# Patient Record
Sex: MALE | Race: WHITE | NOT HISPANIC OR LATINO | Employment: OTHER | URBAN - METROPOLITAN AREA
[De-identification: names, ages, dates, MRNs, and addresses within clinical notes are randomized per-mention and may not be internally consistent; named-entity substitution may affect disease eponyms.]

---

## 2017-02-01 ENCOUNTER — HOSPITAL ENCOUNTER (OUTPATIENT)
Dept: LAB | Facility: MEDICAL CENTER | Age: 62
End: 2017-02-01
Attending: INTERNAL MEDICINE
Payer: COMMERCIAL

## 2017-02-01 ENCOUNTER — TELEPHONE (OUTPATIENT)
Dept: CARDIOLOGY | Facility: MEDICAL CENTER | Age: 62
End: 2017-02-01

## 2017-02-01 ENCOUNTER — OFFICE VISIT (OUTPATIENT)
Dept: CARDIOLOGY | Facility: MEDICAL CENTER | Age: 62
End: 2017-02-01
Payer: COMMERCIAL

## 2017-02-01 DIAGNOSIS — I48.0 PAROXYSMAL ATRIAL FIBRILLATION (HCC): ICD-10-CM

## 2017-02-01 LAB
ALBUMIN SERPL BCP-MCNC: 3.9 G/DL (ref 3.2–4.9)
ALBUMIN/GLOB SERPL: 1.4 G/DL
ALP SERPL-CCNC: 59 U/L (ref 30–99)
ALT SERPL-CCNC: 23 U/L (ref 2–50)
ANION GAP SERPL CALC-SCNC: 7 MMOL/L (ref 0–11.9)
AST SERPL-CCNC: 25 U/L (ref 12–45)
BASOPHILS # BLD AUTO: 0.7 % (ref 0–1.8)
BASOPHILS # BLD: 0.06 K/UL (ref 0–0.12)
BILIRUB SERPL-MCNC: 0.9 MG/DL (ref 0.1–1.5)
BUN SERPL-MCNC: 18 MG/DL (ref 8–22)
CALCIUM SERPL-MCNC: 9.2 MG/DL (ref 8.4–10.2)
CHLORIDE SERPL-SCNC: 106 MMOL/L (ref 96–112)
CO2 SERPL-SCNC: 25 MMOL/L (ref 20–33)
CREAT SERPL-MCNC: 1.28 MG/DL (ref 0.5–1.4)
EOSINOPHIL # BLD AUTO: 0.13 K/UL (ref 0–0.51)
EOSINOPHIL NFR BLD: 1.6 % (ref 0–6.9)
ERYTHROCYTE [DISTWIDTH] IN BLOOD BY AUTOMATED COUNT: 39.7 FL (ref 35.9–50)
GFR SERPL CREATININE-BSD FRML MDRD: 57 ML/MIN/1.73 M 2
GLOBULIN SER CALC-MCNC: 2.7 G/DL (ref 1.9–3.5)
GLUCOSE SERPL-MCNC: 115 MG/DL (ref 65–99)
HCT VFR BLD AUTO: 42.6 % (ref 42–52)
HGB BLD-MCNC: 14.8 G/DL (ref 14–18)
IMM GRANULOCYTES # BLD AUTO: 0.02 K/UL (ref 0–0.11)
IMM GRANULOCYTES NFR BLD AUTO: 0.2 % (ref 0–0.9)
LYMPHOCYTES # BLD AUTO: 3.28 K/UL (ref 1–4.8)
LYMPHOCYTES NFR BLD: 40 % (ref 22–41)
MCH RBC QN AUTO: 30.2 PG (ref 27–33)
MCHC RBC AUTO-ENTMCNC: 34.7 G/DL (ref 33.7–35.3)
MCV RBC AUTO: 86.9 FL (ref 81.4–97.8)
MONOCYTES # BLD AUTO: 0.55 K/UL (ref 0–0.85)
MONOCYTES NFR BLD AUTO: 6.7 % (ref 0–13.4)
NEUTROPHILS # BLD AUTO: 4.17 K/UL (ref 1.82–7.42)
NEUTROPHILS NFR BLD: 50.8 % (ref 44–72)
NRBC # BLD AUTO: 0 K/UL
NRBC BLD AUTO-RTO: 0 /100 WBC
PLATELET # BLD AUTO: 229 K/UL (ref 164–446)
PMV BLD AUTO: 9.8 FL (ref 9–12.9)
POTASSIUM SERPL-SCNC: 3.8 MMOL/L (ref 3.6–5.5)
PROT SERPL-MCNC: 6.6 G/DL (ref 6–8.2)
RBC # BLD AUTO: 4.9 M/UL (ref 4.7–6.1)
SODIUM SERPL-SCNC: 138 MMOL/L (ref 135–145)
TSH SERPL DL<=0.005 MIU/L-ACNC: 2.25 UIU/ML (ref 0.35–5.5)
WBC # BLD AUTO: 8.2 K/UL (ref 4.8–10.8)

## 2017-02-01 PROCEDURE — 85025 COMPLETE CBC W/AUTO DIFF WBC: CPT

## 2017-02-01 PROCEDURE — 80053 COMPREHEN METABOLIC PANEL: CPT

## 2017-02-01 PROCEDURE — 36415 COLL VENOUS BLD VENIPUNCTURE: CPT

## 2017-02-01 PROCEDURE — 84443 ASSAY THYROID STIM HORMONE: CPT

## 2017-02-01 NOTE — MR AVS SNAPSHOT
Mg Loving Rehana TURNER   2017 10:00 AM   Office Visit   MRN: [id number]    Department:  Heart [facility]   Dept Phone:  [phone]    Description:  Male : 1955   Provider:  Oleg Stanton M.D.           Allergies as of 2017     Allergen Noted Reactions    Amoxicillin 2011       Ampicillin 2011         You were diagnosed with     Paroxysmal atrial fibrillation (CMS-HCC)   [701299]         Vital Signs     Smoking Status                   Never Smoker            Basic Information     Date Of Birth Sex Race Ethnicity Preferred Language    1955 Male White Non- English      Your appointments     2017  1:15 PM   ECHO with ECHO [facility]   ECHOCARDIOLOGY [facility])    [address]  [place] [address]   [phone]           No prep              Problem List              ICD-10-CM Priority Class Noted - Resolved    Paroxysmal atrial fibrillation (CMS-HCC) I48.0   2017 - Present      Health Maintenance        Date Due Completion Dates    IMM DTaP/Tdap/Td Vaccine (1 - Tdap) 10/20/1972 ---    COLONOSCOPY 10/20/2003 ---    IMM ZOSTER VACCINE 10/20/2013 ---    IMM INFLUENZA (1) 2016 ---            Current Immunizations     No immunizations on file.      Below and/or attached are the medications your provider expects you to take. Review all of your home medications and newly ordered medications with your provider and/or pharmacist. Follow medication instructions as directed by your provider and/or pharmacist. Please keep your medication list with you and share with your provider. Update the information when medications are discontinued, doses are changed, or new medications (including over-the-counter products) are added; and carry medication information at all times in the event of emergency situations     Allergies:  AMOXICILLIN - (reactions not documented)     AMPICILLIN - (reactions not documented)               Medications  Valid as of:  February 09, 2017 -  1:25 PM    Generic Name Brand Name Tablet Size Instructions for use    Apixaban (Tab) ELIQUIS 5mg Take 1 Tab by mouth 2 Times a Day.        Aspirin Buf([id number]) (Tab) ASCRIPTIN 325 MG Take 2 Tabs by mouth every four hours as needed.        .                 Medicines prescribed today were sent to:     None      Medication refill instructions:       If your prescription bottle indicates you have medication refills left, it is not necessary to call your provider’s office. Please contact your pharmacy and they will refill your medication.    If your prescription bottle indicates you do not have any refills left, you may request refills at any time through one of the following ways: The online [employer] system (except Urgent Care), by calling your provider’s office, or by asking your pharmacy to contact your provider’s office with a refill request. Medication refills are processed only during regular business hours and may not be available until the next business day. Your provider may request additional information or to have a follow-up visit with you prior to refilling your medication.   *Please Note: Medication refills are assigned a new Rx number when refilled electronically. Your pharmacy may indicate that no refills were authorized even though a new prescription for the same medication is available at the pharmacy. Please request the medicine by name with the pharmacy before contacting your provider for a refill.           [employer] Access Code: [email or web address]  Expires: 3/3/2017 11:43 AM    [employer]  A secure, online tool to manage your health information     [employer]® is a secure, online tool that connects you to your personalized health information from the privacy of your home -- day or night - making it very easy for you to manage your healthcare. Once the activation process is completed, you can even access your medical information using the [employer] harmony, which is  available for free in the [employer] Demi store or Google Play store.     [employer] provides the following levels of access (as shown below):   My Chart Features   [facility] Doctor [facility]  Specialists [facility]  Urgent  Care Non-[facility]  Primary Care  Doctor   Email your healthcare team securely and privately 24/7 X X X    Manage appointments: schedule your next appointment; view details of past/upcoming appointments X      Request prescription refills. X      View recent personal medical records, including lab and immunizations X X X X   View health record, including health history, allergies, medications X X X X   Read reports about your outpatient visits, procedures, consult and ER notes X X X X   See your discharge summary, which is a recap of your hospital and/or ER visit that includes your diagnosis, lab results, and care plan. X X       How to register for [employer]:  1. Go to  [email or web address].  2. Click on the Sign Up Now box, which takes you to the New Member Sign Up page. You will need to provide the following information:  a. Enter your [employer] Access Code exactly as it appears at the top of this page. (You will not need to use this code after you’ve completed the sign-up process. If you do not sign up before the expiration date, you must request a new code.)   b. Enter your date of birth.   c. Enter your home email address.   d. Click Submit, and follow the next screen’s instructions.  3. Create a [employer] ID. This will be your [employer] login ID and cannot be changed, so think of one that is secure and easy to remember.  4. Create a [employer] password. You can change your password at any time.  5. Enter your Password Reset Question and Answer. This can be used at a later time if you forget your password.   6. Enter your e-mail address. This allows you to receive e-mail notifications when new information is available in [employer].  7. Click Sign Up. You can now view your health information.    For  assistance activating your [employer] account, call [phone]

## 2017-02-01 NOTE — PROGRESS NOTES
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 By the time he got to the office he was in normal sinus rhythm.  He has a supply of eliquis but will not begin it unless he has a relapse.  I ordered lab and an echocardiogram and he will see Dr. Interiano in follow-up.

## 2017-02-01 NOTE — TELEPHONE ENCOUNTER
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 Duration is uncertain but he thinks it's probably less than 24 hours. He just noticed that his rhythm was more irregular than usual on his way to work this morning. He does have chronic premature beats. He sees Dr. Interiano and is going to see him in follow-up but we will start apixaban now and get lab and an echo in the works. He will use the emergency medical system for any symptoms. He has not had any bleeding problems or contraindications to anticoagulation

## 2017-02-01 NOTE — Clinical Note
[facility]   [address]  [place] [address]  Phone: [phone]  Fax: [phone]              Mg Kimball  1955    Encounter Date: 2/1/2017    Oleg Stanton M.D.          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 By the time he got to the office he was in normal sinus rhythm.  He has a supply of eliquis but will not begin it unless he has a relapse.  I ordered lab and an echocardiogram and he will see Dr. Interiano in follow-up.      No Recipients

## 2017-02-21 ENCOUNTER — APPOINTMENT (OUTPATIENT)
Dept: CARDIOLOGY | Facility: MEDICAL CENTER | Age: 62
End: 2017-02-21
Attending: INTERNAL MEDICINE
Payer: COMMERCIAL

## 2018-01-05 ENCOUNTER — HOSPITAL ENCOUNTER (OUTPATIENT)
Dept: LAB | Facility: MEDICAL CENTER | Age: 63
End: 2018-01-05
Attending: INTERNAL MEDICINE
Payer: COMMERCIAL

## 2018-01-05 LAB
ALBUMIN SERPL BCP-MCNC: 4.1 G/DL (ref 3.2–4.9)
ALBUMIN/GLOB SERPL: 1.5 G/DL
ALP SERPL-CCNC: 60 U/L (ref 30–99)
ALT SERPL-CCNC: 16 U/L (ref 2–50)
ANION GAP SERPL CALC-SCNC: 6 MMOL/L (ref 0–11.9)
AST SERPL-CCNC: 20 U/L (ref 12–45)
BASOPHILS # BLD AUTO: 0.8 % (ref 0–1.8)
BASOPHILS # BLD: 0.06 K/UL (ref 0–0.12)
BILIRUB SERPL-MCNC: 0.9 MG/DL (ref 0.1–1.5)
BUN SERPL-MCNC: 18 MG/DL (ref 8–22)
CALCIUM SERPL-MCNC: 9.5 MG/DL (ref 8.5–10.5)
CHLORIDE SERPL-SCNC: 105 MMOL/L (ref 96–112)
CO2 SERPL-SCNC: 24 MMOL/L (ref 20–33)
CREAT SERPL-MCNC: 1.25 MG/DL (ref 0.5–1.4)
EOSINOPHIL # BLD AUTO: 0.05 K/UL (ref 0–0.51)
EOSINOPHIL NFR BLD: 0.7 % (ref 0–6.9)
ERYTHROCYTE [DISTWIDTH] IN BLOOD BY AUTOMATED COUNT: 39.8 FL (ref 35.9–50)
GFR SERPL CREATININE-BSD FRML MDRD: 58 ML/MIN/1.73 M 2
GLOBULIN SER CALC-MCNC: 2.7 G/DL (ref 1.9–3.5)
GLUCOSE SERPL-MCNC: 105 MG/DL (ref 65–99)
HCT VFR BLD AUTO: 43 % (ref 42–52)
HGB BLD-MCNC: 14.7 G/DL (ref 14–18)
IMM GRANULOCYTES # BLD AUTO: 0.02 K/UL (ref 0–0.11)
IMM GRANULOCYTES NFR BLD AUTO: 0.3 % (ref 0–0.9)
LYMPHOCYTES # BLD AUTO: 2.52 K/UL (ref 1–4.8)
LYMPHOCYTES NFR BLD: 34.2 % (ref 22–41)
MCH RBC QN AUTO: 30 PG (ref 27–33)
MCHC RBC AUTO-ENTMCNC: 34.2 G/DL (ref 33.7–35.3)
MCV RBC AUTO: 87.8 FL (ref 81.4–97.8)
MONOCYTES # BLD AUTO: 0.41 K/UL (ref 0–0.85)
MONOCYTES NFR BLD AUTO: 5.6 % (ref 0–13.4)
NEUTROPHILS # BLD AUTO: 4.3 K/UL (ref 1.82–7.42)
NEUTROPHILS NFR BLD: 58.4 % (ref 44–72)
NRBC # BLD AUTO: 0 K/UL
NRBC BLD-RTO: 0 /100 WBC
PLATELET # BLD AUTO: 253 K/UL (ref 164–446)
PMV BLD AUTO: 10.9 FL (ref 9–12.9)
POTASSIUM SERPL-SCNC: 4 MMOL/L (ref 3.6–5.5)
PROT SERPL-MCNC: 6.8 G/DL (ref 6–8.2)
RBC # BLD AUTO: 4.9 M/UL (ref 4.7–6.1)
SODIUM SERPL-SCNC: 135 MMOL/L (ref 135–145)
WBC # BLD AUTO: 7.4 K/UL (ref 4.8–10.8)

## 2018-01-05 PROCEDURE — 36415 COLL VENOUS BLD VENIPUNCTURE: CPT

## 2018-01-05 PROCEDURE — 80053 COMPREHEN METABOLIC PANEL: CPT

## 2018-01-05 PROCEDURE — 85025 COMPLETE CBC W/AUTO DIFF WBC: CPT

## 2018-01-16 ENCOUNTER — HOSPITAL ENCOUNTER (OUTPATIENT)
Dept: RADIOLOGY | Facility: MEDICAL CENTER | Age: 63
End: 2018-01-16
Attending: INTERNAL MEDICINE
Payer: COMMERCIAL

## 2018-01-16 DIAGNOSIS — R10.12 LUQ PAIN: ICD-10-CM

## 2018-01-16 PROCEDURE — 74177 CT ABD & PELVIS W/CONTRAST: CPT

## 2018-01-16 PROCEDURE — 700117 HCHG RX CONTRAST REV CODE 255: Performed by: INTERNAL MEDICINE

## 2018-01-16 RX ADMIN — IOHEXOL 100 ML: 350 INJECTION, SOLUTION INTRAVENOUS at 14:37

## 2018-01-16 RX ADMIN — IOHEXOL 50 ML: 240 INJECTION, SOLUTION INTRATHECAL; INTRAVASCULAR; INTRAVENOUS; ORAL at 14:37

## 2018-10-01 ENCOUNTER — IMMUNIZATION (OUTPATIENT)
Dept: OCCUPATIONAL MEDICINE | Facility: CLINIC | Age: 63
End: 2018-10-01

## 2018-10-01 DIAGNOSIS — Z23 NEED FOR VACCINATION: ICD-10-CM

## 2018-10-13 PROCEDURE — 90686 IIV4 VACC NO PRSV 0.5 ML IM: CPT | Performed by: PREVENTIVE MEDICINE

## 2019-05-18 ENCOUNTER — APPOINTMENT (OUTPATIENT)
Dept: RADIOLOGY | Facility: MEDICAL CENTER | Age: 64
End: 2019-05-18
Attending: EMERGENCY MEDICINE
Payer: COMMERCIAL

## 2019-05-18 ENCOUNTER — HOSPITAL ENCOUNTER (EMERGENCY)
Facility: MEDICAL CENTER | Age: 64
End: 2019-05-18
Attending: EMERGENCY MEDICINE
Payer: COMMERCIAL

## 2019-05-18 VITALS
RESPIRATION RATE: 19 BRPM | HEIGHT: 71 IN | TEMPERATURE: 97.6 F | BODY MASS INDEX: 26.33 KG/M2 | OXYGEN SATURATION: 97 % | HEART RATE: 83 BPM | WEIGHT: 188.05 LBS | SYSTOLIC BLOOD PRESSURE: 120 MMHG | DIASTOLIC BLOOD PRESSURE: 86 MMHG

## 2019-05-18 DIAGNOSIS — I48.0 PAROXYSMAL ATRIAL FIBRILLATION (HCC): ICD-10-CM

## 2019-05-18 LAB
ALBUMIN SERPL BCP-MCNC: 4.1 G/DL (ref 3.2–4.9)
ALBUMIN/GLOB SERPL: 1.4 G/DL
ALP SERPL-CCNC: 60 U/L (ref 30–99)
ALT SERPL-CCNC: 21 U/L (ref 2–50)
ANION GAP SERPL CALC-SCNC: 10 MMOL/L (ref 0–11.9)
AST SERPL-CCNC: 25 U/L (ref 12–45)
BASOPHILS # BLD AUTO: 0.6 % (ref 0–1.8)
BASOPHILS # BLD: 0.06 K/UL (ref 0–0.12)
BILIRUB SERPL-MCNC: 0.8 MG/DL (ref 0.1–1.5)
BUN SERPL-MCNC: 18 MG/DL (ref 8–22)
CALCIUM SERPL-MCNC: 9.1 MG/DL (ref 8.4–10.2)
CHLORIDE SERPL-SCNC: 105 MMOL/L (ref 96–112)
CO2 SERPL-SCNC: 25 MMOL/L (ref 20–33)
CREAT SERPL-MCNC: 1.23 MG/DL (ref 0.5–1.4)
EKG IMPRESSION: NORMAL
EOSINOPHIL # BLD AUTO: 0.2 K/UL (ref 0–0.51)
EOSINOPHIL NFR BLD: 2 % (ref 0–6.9)
ERYTHROCYTE [DISTWIDTH] IN BLOOD BY AUTOMATED COUNT: 41.9 FL (ref 35.9–50)
GLOBULIN SER CALC-MCNC: 2.9 G/DL (ref 1.9–3.5)
GLUCOSE SERPL-MCNC: 97 MG/DL (ref 65–99)
HCT VFR BLD AUTO: 47.8 % (ref 42–52)
HGB BLD-MCNC: 16 G/DL (ref 14–18)
IMM GRANULOCYTES # BLD AUTO: 0.05 K/UL (ref 0–0.11)
IMM GRANULOCYTES NFR BLD AUTO: 0.5 % (ref 0–0.9)
INR PPP: 0.96 (ref 0.87–1.13)
LYMPHOCYTES # BLD AUTO: 4.86 K/UL (ref 1–4.8)
LYMPHOCYTES NFR BLD: 47.8 % (ref 22–41)
MCH RBC QN AUTO: 29.9 PG (ref 27–33)
MCHC RBC AUTO-ENTMCNC: 33.5 G/DL (ref 33.7–35.3)
MCV RBC AUTO: 89.2 FL (ref 81.4–97.8)
MONOCYTES # BLD AUTO: 0.72 K/UL (ref 0–0.85)
MONOCYTES NFR BLD AUTO: 7.1 % (ref 0–13.4)
NEUTROPHILS # BLD AUTO: 4.27 K/UL (ref 1.82–7.42)
NEUTROPHILS NFR BLD: 42 % (ref 44–72)
NRBC # BLD AUTO: 0 K/UL
NRBC BLD-RTO: 0 /100 WBC
PLATELET # BLD AUTO: 248 K/UL (ref 164–446)
PMV BLD AUTO: 9.7 FL (ref 9–12.9)
POTASSIUM SERPL-SCNC: 3.4 MMOL/L (ref 3.6–5.5)
PROT SERPL-MCNC: 7 G/DL (ref 6–8.2)
PROTHROMBIN TIME: 12.7 SEC (ref 12–14.6)
RBC # BLD AUTO: 5.36 M/UL (ref 4.7–6.1)
SODIUM SERPL-SCNC: 140 MMOL/L (ref 135–145)
TROPONIN I SERPL-MCNC: <0.02 NG/ML (ref 0–0.04)
WBC # BLD AUTO: 10.2 K/UL (ref 4.8–10.8)

## 2019-05-18 PROCEDURE — 99285 EMERGENCY DEPT VISIT HI MDM: CPT

## 2019-05-18 PROCEDURE — 85610 PROTHROMBIN TIME: CPT

## 2019-05-18 PROCEDURE — 93005 ELECTROCARDIOGRAM TRACING: CPT | Performed by: EMERGENCY MEDICINE

## 2019-05-18 PROCEDURE — 85025 COMPLETE CBC W/AUTO DIFF WBC: CPT

## 2019-05-18 PROCEDURE — 700102 HCHG RX REV CODE 250 W/ 637 OVERRIDE(OP): Performed by: EMERGENCY MEDICINE

## 2019-05-18 PROCEDURE — 96376 TX/PRO/DX INJ SAME DRUG ADON: CPT

## 2019-05-18 PROCEDURE — 71045 X-RAY EXAM CHEST 1 VIEW: CPT

## 2019-05-18 PROCEDURE — 700111 HCHG RX REV CODE 636 W/ 250 OVERRIDE (IP)

## 2019-05-18 PROCEDURE — 700111 HCHG RX REV CODE 636 W/ 250 OVERRIDE (IP): Performed by: EMERGENCY MEDICINE

## 2019-05-18 PROCEDURE — A9270 NON-COVERED ITEM OR SERVICE: HCPCS | Performed by: EMERGENCY MEDICINE

## 2019-05-18 PROCEDURE — 80053 COMPREHEN METABOLIC PANEL: CPT

## 2019-05-18 PROCEDURE — 96374 THER/PROPH/DIAG INJ IV PUSH: CPT

## 2019-05-18 PROCEDURE — 84484 ASSAY OF TROPONIN QUANT: CPT

## 2019-05-18 PROCEDURE — 36415 COLL VENOUS BLD VENIPUNCTURE: CPT

## 2019-05-18 PROCEDURE — 700105 HCHG RX REV CODE 258: Performed by: EMERGENCY MEDICINE

## 2019-05-18 RX ORDER — SODIUM CHLORIDE 9 MG/ML
1000 INJECTION, SOLUTION INTRAVENOUS ONCE
Status: COMPLETED | OUTPATIENT
Start: 2019-05-18 | End: 2019-05-18

## 2019-05-18 RX ORDER — DILTIAZEM HYDROCHLORIDE 5 MG/ML
INJECTION INTRAVENOUS
Status: COMPLETED
Start: 2019-05-18 | End: 2019-05-18

## 2019-05-18 RX ORDER — DILTIAZEM HYDROCHLORIDE 5 MG/ML
10 INJECTION INTRAVENOUS ONCE
Status: COMPLETED | OUTPATIENT
Start: 2019-05-18 | End: 2019-05-18

## 2019-05-18 RX ORDER — METOPROLOL SUCCINATE 25 MG/1
25 TABLET, EXTENDED RELEASE ORAL ONCE
Status: COMPLETED | OUTPATIENT
Start: 2019-05-18 | End: 2019-05-18

## 2019-05-18 RX ADMIN — DILTIAZEM HYDROCHLORIDE 10 MG: 5 INJECTION INTRAVENOUS at 03:59

## 2019-05-18 RX ADMIN — METOPROLOL SUCCINATE 25 MG: 25 TABLET, EXTENDED RELEASE ORAL at 05:23

## 2019-05-18 RX ADMIN — RIVAROXABAN 20 MG: 20 TABLET, FILM COATED ORAL at 04:51

## 2019-05-18 RX ADMIN — SODIUM CHLORIDE 1000 ML: 9 INJECTION, SOLUTION INTRAVENOUS at 03:21

## 2019-05-18 RX ADMIN — DILTIAZEM HYDROCHLORIDE 10 MG: 5 INJECTION INTRAVENOUS at 03:20

## 2019-05-18 RX ADMIN — SODIUM CHLORIDE 1000 ML: 9 INJECTION, SOLUTION INTRAVENOUS at 04:00

## 2019-05-18 NOTE — ED PROVIDER NOTES
ED Provider Note    CHIEF COMPLAINT  Chief Complaint   Patient presents with   • Rapid Heart Beat   • Shortness of Breath       HPI  Edanastacia Kimball II is a 63 y.o. male who is otherwise healthy, distant hx provoked DVT no longer on anticoagulation, who presents for acute onset of atrial fibrillation.  Patient works as an anesthesiologist, states that a few years ago he had a self-limited episode of atrial fibrillation while he was working which spontaneously converted back to normal sinus rhythm over the course of a few hours.  At that time he had been drinking some energy drinks and it was thought that that was the precipitating factor for his episode of atrial fibrillation.  He is otherwise been doing well since that episode in 2017, is not on any anticoagulation, but today he had a longer day than usual, with decreased water intake and heavy exercise but some increased soda intake, and 2 hours prior to presentation started to feel a fluttering in his chest similar to the previous episode of atrial fibrillation.  He checked his pulse, it was irregular, with a rate of about 170.  He endorses shortness of breath with ambulation but none with rest, no chest pain, no weakness, no dizziness.    REVIEW OF SYSTEMS  Pertinent positives: Irregular heart rate, shortness of breath with ambulation  Pertinent negatives: No chest pain, no dizziness, no weakness, no abdominal pain  10 + systems reviewed and otherwise negative    PAST MEDICAL HISTORY   has a past medical history of DVT (deep venous thrombosis) (CMS-[facility]) and Paroxysmal atrial fibrillation (CMS-[facility]) (2/1/2017).    SOCIAL HISTORY  Social History     Social History Main Topics   • Smoking status: Never Smoker   • Smokeless tobacco: Not on file   • Alcohol use No   • Drug use: No   • Sexual activity: Not on file       SURGICAL HISTORY   has a past surgical history that includes acl repair; tonsillectomy; other; wound exploration ortho (8/13/2011); and  "irrigation & debridement ortho (8/13/2011).    CURRENT MEDICATIONS  Home Medications    **Home medications have not yet been reviewed for this encounter**         ALLERGIES  Allergies   Allergen Reactions   • Amoxicillin    • Ampicillin        PHYSICAL EXAM  VITAL SIGNS: /86   Pulse 65   Temp 36.4 °C (97.6 °F) (Temporal)   Resp 17   Ht 1.803 m (5' 11\")   Wt 85.3 kg (188 lb 0.8 oz)   SpO2 93%   BMI 26.23 kg/m²   Pulse ox interpretation: I interpret this pulse ox as normal.  Constitutional: Alert in no apparent distress   HEENT: EOMI, PERRL, mucous membranes moist, posterior OP clear without exudate   Neck: supple, no cervical lymphadenopathy  Cardiovascular: Irregularly irregular rhythm, no murmurs, no LE edema  Chest wall: No tenderness to palpation   Thorax & Lungs: clear to auscultation bilaterally, no increased WOB, no wheeze or rhonchi   Abdomen: Soft throughout without rebound or guarding, no CVAT   Skin: Warm, Dry, No erythema, no rash   Musculoskeletal: Good range of motion in all major joints.    Neurologic:  Alert and oriented, without focal deficits. Fluent speech.   Psychiatric: Affect normal, Judgment normal, Mood normal.       DIAGNOSTIC STUDIES / PROCEDURES    EKG  See below    LABS  Results for orders placed or performed during the hospital encounter of 05/18/19   CBC WITH DIFFERENTIAL   Result Value Ref Range    WBC 10.2 4.8 - 10.8 K/uL    RBC 5.36 4.70 - 6.10 M/uL    Hemoglobin 16.0 14.0 - 18.0 g/dL    Hematocrit 47.8 42.0 - 52.0 %    MCV 89.2 81.4 - 97.8 fL    MCH 29.9 27.0 - 33.0 pg    MCHC 33.5 (L) 33.7 - 35.3 g/dL    RDW 41.9 35.9 - 50.0 fL    Platelet Count 248 164 - 446 K/uL    MPV 9.7 9.0 - 12.9 fL    Neutrophils-Polys 42.00 (L) 44.00 - 72.00 %    Lymphocytes 47.80 (H) 22.00 - 41.00 %    Monocytes 7.10 0.00 - 13.40 %    Eosinophils 2.00 0.00 - 6.90 %    Basophils 0.60 0.00 - 1.80 %    Immature Granulocytes 0.50 0.00 - 0.90 %    Nucleated RBC 0.00 /100 WBC    Neutrophils " (Absolute) 4.27 1.82 - 7.42 K/uL    Lymphs (Absolute) 4.86 (H) 1.00 - 4.80 K/uL    Monos (Absolute) 0.72 0.00 - 0.85 K/uL    Eos (Absolute) 0.20 0.00 - 0.51 K/uL    Baso (Absolute) 0.06 0.00 - 0.12 K/uL    Immature Granulocytes (abs) 0.05 0.00 - 0.11 K/uL    NRBC (Absolute) 0.00 K/uL   Comp Metabolic Panel   Result Value Ref Range    Sodium 140 135 - 145 mmol/L    Potassium 3.4 (L) 3.6 - 5.5 mmol/L    Chloride 105 96 - 112 mmol/L    Co2 25 20 - 33 mmol/L    Anion Gap 10.0 0.0 - 11.9    Glucose 97 65 - 99 mg/dL    Bun 18 8 - 22 mg/dL    Creatinine 1.23 0.50 - 1.40 mg/dL    Calcium 9.1 8.4 - 10.2 mg/dL    AST(SGOT) 25 12 - 45 U/L    ALT(SGPT) 21 2 - 50 U/L    Alkaline Phosphatase 60 30 - 99 U/L    Total Bilirubin 0.8 0.1 - 1.5 mg/dL    Albumin 4.1 3.2 - 4.9 g/dL    Total Protein 7.0 6.0 - 8.2 g/dL    Globulin 2.9 1.9 - 3.5 g/dL    A-G Ratio 1.4 g/dL   TROPONIN   Result Value Ref Range    Troponin I <0.02 0.00 - 0.04 ng/mL   ESTIMATED GFR   Result Value Ref Range    GFR If African American >60 >60 mL/min/1.73 m 2    GFR If Non African American 59 (A) >60 mL/min/1.73 m 2   EKG   Result Value Ref Range    Report       [facility] Emergency Dept.    Test Date:  2019  Pt Name:    JENNA NAVAS                Department: [facility]  MRN:        [id number]                      Room:       -ROOM 3  Gender:     Male                         Technician:   :        [id number]                   Requested By:FELIPA KAPLAN  Order #:    [id number]                    Reading MD: Felipa Kaplan MD    Measurements  Intervals                                Axis  Rate:       137                          P:  VT:                                      QRS:        -29  QRSD:       90                           T:          55  QT:         312  QTc:        471    Interpretive Statements  ATRIAL FIBRILLATION  BORDERLINE LEFT AXIS DEVIATION  ST DEPRESSION, CONSIDER ISCHEMIA, ANT-LAT LDS  Compared to ECG 2011  10:00:04  atrial fibrillation new, ST depressions new    Electronically Signed On 5- 3:24:44 PDT by Felipa Kaplan MD       Repeat EKG performed at 5:16 AM.  Atrial fibrillation, rate 88, resolution of ST depressions in V2 through V4, no ST elevations, no T wave inversions, otherwise similar EKG to prior with improved rate.    RADIOLOGY  DX-CHEST-PORTABLE (1 VIEW)   Final Result         1.  No acute cardiopulmonary disease.              COURSE & MEDICAL DECISION MAKING  Nursing notes and vital signs were reviewed. (See chart for details)  The patient's medical  records were reviewed, history was obtained from the patient and wife.    62 yo M, distant hx provoked DVT no longer on anticoagulation, self limiting episode of AF (a few hours) in 2017 thought 2/2 energy drinks, presents for acute onset of irregular heart rate a few hours PTA. Physical exam is notable for well appearing, afib on monitor, normal blood pressure, lungs CTA, brisk radial pulses, and no LE edema.  Differential includes hyperthyroid state, electrolyte disorder, ischemia, mass.  As patient has had episodes like this before and they are self-limiting I have low clinical suspicion at this time for thyrotoxicosis or hyperthyroid state.  Plan to check CBC, CMP, troponin, EKG, and chest x-ray.  Given past episode of atrial fibrillation in setting of energy drinks, today's a history of decreased water intake with increased soda intake, plan for rate control with diltiazem as well as IV fluids to see if cardioversion with these interventions is successful.  Patient could qualify for electrical cardioversion if above interventions are not successful as symptoms have been ongoing for only a few hours and he is very aware of his heart rate and rhythm.  Final disposition and management pending above results and response interventions.    INTERVENTIONS  Medications   DILTIAZEM HCL 25 MG/5ML IV SOLN (not administered)   rivaroxaban (XARELTO) tablet 20  mg (not administered)   DILTIAZem (CARDIZEM) injection 10 mg (10 mg Intravenous Given 5/18/19 0320)   NS infusion 1,000 mL (0 mL Intravenous Stopped 5/18/19 0400)   DILTIAZem (CARDIZEM) injection 10 mg (10 mg Intravenous Given 5/18/19 0359)   NS infusion 1,000 mL (1,000 mL Intravenous New Bag 5/18/19 0400)       4:22 AM  Feeling improved. Still in afib with rate 80s-90s. Discussed electrical cardioversion with patient but he wishes to defer. Plan to start anticoagulation with Xarelto, first dose here, d/c to home with Xarelto, metoprolol 25 mg BID for home rate control, to follow up with his cardiologist on Monday for reassessment, echocardiogram, and further management as needed.    5:27 AM  Continues to feel well.  Intermittent episodes of heart rate up to 120 that rapidly resolved heart rate in 80s.  No shortness of breath.  Repeat EKG shows resolution of ST depressions.  Plan for first dose of metoprolol here in the ED, first dose of Xarelto, follow-up cardiology on Monday for repeat evaluation and determination of medical management of patient's atrial fibrillation if it persists.  Patient discharged home with strict return precautions and improved condition in company of wife.    FINAL IMPRESSION  (I48.0) Paroxysmal atrial fibrillation (HCC)      Electronically signed by: Felipa Kaplan, 5/18/2019 2:33 AM      This dictation was created using voice recognition software. The accuracy of the dictation is limited to the abilities of the software. I expect there may be some errors of grammar and possibly content. The nursing notes were reviewed and certain aspects of this information were incorporated into this note.

## 2019-05-18 NOTE — ED NOTES
DC Pt home,  presctiption given.  Pt aware of f/u instructions, aware to return for any changes or concerns.  Pt verbalized understanding of instructions to follow up with PCP. No further questions upon discharge home from emergency room. Pt ambulated out of ER without difficulty.

## 2019-05-18 NOTE — ED TRIAGE NOTES
Chief Complaint   Patient presents with   • Rapid Heart Beat   • Shortness of Breath       /86   Pulse (!) 132   Temp 36.4 °C (97.6 °F) (Temporal)   Resp 19   SpO2 96%

## 2019-06-17 ENCOUNTER — TELEPHONE (OUTPATIENT)
Dept: CARDIOLOGY | Facility: MEDICAL CENTER | Age: 64
End: 2019-06-17

## 2019-06-17 DIAGNOSIS — I48.0 PAROXYSMAL ATRIAL FIBRILLATION (HCC): ICD-10-CM

## 2019-06-17 DIAGNOSIS — R06.02 SOB (SHORTNESS OF BREATH): ICD-10-CM

## 2019-07-11 ENCOUNTER — HOSPITAL ENCOUNTER (OUTPATIENT)
Dept: LAB | Facility: MEDICAL CENTER | Age: 64
End: 2019-07-11
Attending: PLASTIC SURGERY
Payer: COMMERCIAL

## 2019-07-11 PROCEDURE — 88305 TISSUE EXAM BY PATHOLOGIST: CPT

## 2019-07-12 LAB — PATHOLOGY CONSULT NOTE: NORMAL

## 2019-07-17 ENCOUNTER — HOSPITAL ENCOUNTER (OUTPATIENT)
Dept: LAB | Facility: MEDICAL CENTER | Age: 64
End: 2019-07-17
Attending: PLASTIC SURGERY
Payer: COMMERCIAL

## 2019-07-17 PROCEDURE — 88312 SPECIAL STAINS GROUP 1: CPT

## 2019-07-17 PROCEDURE — 88305 TISSUE EXAM BY PATHOLOGIST: CPT

## 2019-07-18 LAB — PATHOLOGY CONSULT NOTE: NORMAL

## 2019-07-19 LAB — AMBIGUOUS DTTM AMBI4: NORMAL

## 2019-09-24 ENCOUNTER — HOSPITAL ENCOUNTER (OUTPATIENT)
Dept: LAB | Facility: MEDICAL CENTER | Age: 64
End: 2019-09-24
Attending: PLASTIC SURGERY
Payer: COMMERCIAL

## 2019-09-24 LAB — PATHOLOGY CONSULT NOTE: NORMAL

## 2019-09-24 PROCEDURE — 88305 TISSUE EXAM BY PATHOLOGIST: CPT

## 2020-02-18 ENCOUNTER — HOSPITAL ENCOUNTER (OUTPATIENT)
Dept: LAB | Facility: MEDICAL CENTER | Age: 65
End: 2020-02-18
Attending: PLASTIC SURGERY
Payer: COMMERCIAL

## 2020-02-18 LAB — PATHOLOGY CONSULT NOTE: NORMAL

## 2020-02-18 PROCEDURE — 88305 TISSUE EXAM BY PATHOLOGIST: CPT

## 2020-04-30 ENCOUNTER — TELEPHONE (OUTPATIENT)
Dept: CARDIOLOGY | Facility: MEDICAL CENTER | Age: 65
End: 2020-04-30

## 2020-04-30 NOTE — TELEPHONE ENCOUNTER
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  He has been taking his 12.5 mg of Metoprolol at night and did notice some change when he started doing that.  He says that his BP is \"fine\" and HR is averaging about 56.  He has a [employer] demi and has been sending the transmissions to the Demi site (apparantly there is a doctor that reviews at the other end) and PAC's is the main finding.      Although Ed is \"not worried\" about the PAC's, and they \"don't really bother him,\" he would like Dr. Orozco to know that this is happening and wonders if he is a candidate for going into atrial fib (his main concern).  He has talked recently with his neighbor- Dr. Interiano- and said that Dr. Interiano told him not to worry and the PAC's are normal.      I asked if he would like to increase the dose of Metoprolol, and he said that he would if that was recommended by Dr. Orozco.  I asked him if he could start sending us his Kardia readings via MY CHART, and he agrees to do this as well.       "

## 2020-04-30 NOTE — TELEPHONE ENCOUNTER
I spoke to him. Has PAC's no real symptoms. No a fib. No change made to meds. He will send Kardia recordings to us

## 2020-04-30 NOTE — TELEPHONE ENCOUNTER
Orozco     Patient is calling wanting to speak with DS regarding extra heartbeats he is having, please call him at [phone].    Thanks

## 2020-08-17 ENCOUNTER — TELEPHONE (OUTPATIENT)
Dept: CARDIOLOGY | Facility: MEDICAL CENTER | Age: 65
End: 2020-08-17

## 2020-08-17 DIAGNOSIS — R07.89 OTHER CHEST PAIN: ICD-10-CM

## 2020-08-17 NOTE — TELEPHONE ENCOUNTER
Per Dr. Interiano: order coronary calcification score ct scan.  Order entered. Called pt. To advise. He will call to schedule test.

## 2020-08-31 ENCOUNTER — HOSPITAL ENCOUNTER (OUTPATIENT)
Dept: RADIOLOGY | Facility: MEDICAL CENTER | Age: 65
End: 2020-08-31
Attending: INTERNAL MEDICINE
Payer: COMMERCIAL

## 2020-08-31 DIAGNOSIS — R07.89 OTHER CHEST PAIN: ICD-10-CM

## 2020-08-31 PROCEDURE — 4410556 CT-CARDIAC SCORING

## 2020-12-17 DIAGNOSIS — Z23 NEED FOR VACCINATION: ICD-10-CM

## 2020-12-29 ENCOUNTER — IMMUNIZATION (OUTPATIENT)
Dept: FAMILY PLANNING/WOMEN'S HEALTH CLINIC | Facility: IMMUNIZATION CENTER | Age: 65
End: 2020-12-29
Attending: FAMILY MEDICINE
Payer: MEDICARE

## 2020-12-29 DIAGNOSIS — Z23 ENCOUNTER FOR VACCINATION: Primary | ICD-10-CM

## 2020-12-29 DIAGNOSIS — Z23 NEED FOR VACCINATION: ICD-10-CM

## 2020-12-29 PROCEDURE — 91301 MODERNA SARS-COV-2 VACCINE: CPT

## 2020-12-29 PROCEDURE — 0011A MODERNA SARS-COV-2 VACCINE: CPT

## 2021-01-29 PROCEDURE — 0012A MODERNA SARS-COV-2 VACCINE: CPT

## 2021-01-29 PROCEDURE — 91301 MODERNA SARS-COV-2 VACCINE: CPT

## 2021-01-31 ENCOUNTER — IMMUNIZATION (OUTPATIENT)
Dept: FAMILY PLANNING/WOMEN'S HEALTH CLINIC | Facility: IMMUNIZATION CENTER | Age: 66
End: 2021-01-31
Payer: MEDICARE

## 2021-01-31 DIAGNOSIS — Z23 ENCOUNTER FOR VACCINATION: Primary | ICD-10-CM

## 2021-06-11 ENCOUNTER — HOSPITAL ENCOUNTER (OUTPATIENT)
Facility: MEDICAL CENTER | Age: 66
End: 2021-06-11
Attending: PLASTIC SURGERY
Payer: MEDICARE

## 2021-06-11 LAB — PATHOLOGY CONSULT NOTE: NORMAL

## 2021-06-11 PROCEDURE — 88305 TISSUE EXAM BY PATHOLOGIST: CPT

## 2021-09-20 ENCOUNTER — OFFICE VISIT (OUTPATIENT)
Dept: CARDIOLOGY | Facility: MEDICAL CENTER | Age: 66
End: 2021-09-20
Payer: MEDICARE

## 2021-09-20 VITALS
DIASTOLIC BLOOD PRESSURE: 64 MMHG | BODY MASS INDEX: 23.34 KG/M2 | HEIGHT: 70 IN | HEART RATE: 64 BPM | WEIGHT: 163 LBS | SYSTOLIC BLOOD PRESSURE: 116 MMHG | OXYGEN SATURATION: 100 %

## 2021-09-20 DIAGNOSIS — I49.1 PAC (PREMATURE ATRIAL CONTRACTION): ICD-10-CM

## 2021-09-20 DIAGNOSIS — I48.0 PAROXYSMAL ATRIAL FIBRILLATION (HCC): ICD-10-CM

## 2021-09-20 DIAGNOSIS — R03.0 BORDERLINE HYPERTENSION: ICD-10-CM

## 2021-09-20 LAB — EKG IMPRESSION: NORMAL

## 2021-09-20 PROCEDURE — 93000 ELECTROCARDIOGRAM COMPLETE: CPT | Performed by: INTERNAL MEDICINE

## 2021-09-20 PROCEDURE — 99204 OFFICE O/P NEW MOD 45 MIN: CPT | Performed by: INTERNAL MEDICINE

## 2021-09-20 RX ORDER — FLECAINIDE ACETATE 50 MG/1
50 TABLET ORAL 2 TIMES DAILY
Qty: 180 TABLET | Refills: 3 | Status: SHIPPED | OUTPATIENT
Start: 2021-09-20 | End: 2021-11-15 | Stop reason: SDUPTHER

## 2021-09-20 RX ORDER — ROSUVASTATIN CALCIUM 10 MG/1
10 TABLET, COATED ORAL
COMMUNITY
Start: 2021-07-14

## 2021-09-20 RX ORDER — LOSARTAN POTASSIUM 100 MG/1
100 TABLET ORAL DAILY
COMMUNITY

## 2021-09-20 ASSESSMENT — ENCOUNTER SYMPTOMS
HEARTBURN: 1
BRUISES/BLEEDS EASILY: 0
MYALGIAS: 0
HEADACHES: 1
EYE PAIN: 0
NAUSEA: 0
EYE DISCHARGE: 0
BLURRED VISION: 0
SPEECH CHANGE: 0
NERVOUS/ANXIOUS: 0
PALPITATIONS: 1
LOSS OF CONSCIOUSNESS: 0
DEPRESSION: 0
BACK PAIN: 1
NECK PAIN: 1
VOMITING: 0
WHEEZING: 0
ABDOMINAL PAIN: 0
CHILLS: 0
COUGH: 0
HEMOPTYSIS: 0
FEVER: 0

## 2021-09-20 NOTE — PROGRESS NOTES
Chief Complaint   Patient presents with   • Atrial Fibrillation     F/V Dx: Paroxysmal atrial fibrillation (HCC)       Subjective     Ed Inder Kimball II is a 65 y.o. male who presents today as a new patient secondary to recurrent PACs and PAF.  On metoprolol.  At higher doses of metoprolol had fatigue. Has not tried antiarrhythmics.  Chads vasc score 1-2.  Borderline hypertension.  No alcohol.  Some caffeine use. No drug use.  Retired physician in excellent shape. Previously in the .  Has a [employer].  Previous college .  Positive family history of heart disease in mother.  On statins.  Coronary calcium scan 0.  Mildly elevated PSA.. Normal TSH.    Past Medical History:   Diagnosis Date   • DVT (deep venous thrombosis) (HCC)    • Paroxysmal atrial fibrillation (HCC) 2/1/2017     Past Surgical History:   Procedure Laterality Date   • WOUND EXPLORATION ORTHO  8/13/2011    Performed by JORGE ENGLAND at SURGERY [facility]   • IRRIGATION & DEBRIDEMENT ORTHO  8/13/2011    Performed by JORGE ENGLAND at SURGERY [facility]   • ACL REPAIR      bilateral   • OTHER      MCL and meniscus repair   • TONSILLECTOMY       History reviewed. No pertinent family history.  Social History     Socioeconomic History   • Marital status:      Spouse name: Not on file   • Number of children: Not on file   • Years of education: Not on file   • Highest education level: Professional school degree (e.g., MD, DDS, DVM, ZAFAR)   Occupational History   • Not on file   Tobacco Use   • Smoking status: Never Smoker   • Smokeless tobacco: Never Used   Substance and Sexual Activity   • Alcohol use: No   • Drug use: No   • Sexual activity: Not on file   Other Topics Concern   • Not on file   Social History Narrative   • Not on file     Social Determinants of Health     Financial Resource Strain: Low Risk    • Difficulty of Paying Living Expenses: Not hard at all   Food Insecurity: No Food Insecurity   •  Worried About Running Out of Food in the Last Year: Never true   • Ran Out of Food in the Last Year: Never true   Transportation Needs: No Transportation Needs   • Lack of Transportation (Medical): No   • Lack of Transportation (Non-Medical): No   Physical Activity: Sufficiently Active   • Days of Exercise per Week: 4 days   • Minutes of Exercise per Session: 60 min   Stress: No Stress Concern Present   • Feeling of Stress : Only a little   Social Connections: Socially Integrated   • Frequency of Communication with Friends and Family: Three times a week   • Frequency of Social Gatherings with Friends and Family: Patient refused   • Attends Evangelical Services: More than 4 times per year   • Active Member of Clubs or Organizations: Yes   • Attends Club or Organization Meetings: More than 4 times per year   • Marital Status:    Intimate Partner Violence:    • Fear of Current or Ex-Partner:    • Emotionally Abused:    • Physically Abused:    • Sexually Abused:      Allergies   Allergen Reactions   • Amoxicillin    • Ampicillin    • Augmentin Shortness of Breath     Outpatient Encounter Medications as of 9/20/2021   Medication Sig Dispense Refill   • losartan (COZAAR) 100 MG Tab Take 100 mg by mouth every day.     • rosuvastatin (CRESTOR) 10 MG Tab Take 10 mg by mouth every day.     • flecainide (TAMBOCOR) 50 MG tablet Take 1 Tablet by mouth 2 times a day. 180 Tablet 3   • metoprolol (LOPRESSOR) 25 MG Tab Take 0.5 Tabs by mouth every day. 1 Tab 1     No facility-administered encounter medications on file as of 9/20/2021.     Review of Systems   Constitutional: Negative for chills and fever.   HENT: Positive for congestion.    Eyes: Negative for blurred vision, pain and discharge.   Respiratory: Negative for cough, hemoptysis and wheezing.    Cardiovascular: Positive for palpitations. Negative for chest pain.   Gastrointestinal: Positive for heartburn. Negative for abdominal pain, nausea and vomiting.  "  Musculoskeletal: Positive for back pain and neck pain. Negative for joint pain and myalgias.   Skin: Negative for itching and rash.   Neurological: Positive for headaches. Negative for speech change and loss of consciousness.   Endo/Heme/Allergies: Does not bruise/bleed easily.   Psychiatric/Behavioral: Negative for depression. The patient is not nervous/anxious.    All other systems reviewed and are negative.             Objective     /64 (BP Location: Left arm, Patient Position: Sitting, BP Cuff Size: Adult)   Pulse 64   Ht 1.778 m (5' 10\")   Wt 73.9 kg (163 lb)   SpO2 100%   BMI 23.39 kg/m²     Physical Exam  Constitutional:       Appearance: He is well-developed.   HENT:      Head: Normocephalic and atraumatic.   Cardiovascular:      Rate and Rhythm: Normal rate and regular rhythm.      Heart sounds: No murmur heard.   No friction rub. No gallop.    Pulmonary:      Effort: Pulmonary effort is normal.      Breath sounds: Normal breath sounds.   Abdominal:      Palpations: Abdomen is soft.   Musculoskeletal:         General: Normal range of motion.      Cervical back: Normal range of motion and neck supple.   Skin:     General: Skin is warm and dry.   Neurological:      Mental Status: He is alert and oriented to person, place, and time.   Psychiatric:         Behavior: Behavior normal.         Thought Content: Thought content normal.         Judgment: Judgment normal.                Assessment & Plan     1. Paroxysmal atrial fibrillation (HCC)  EKG    REFERRAL TO PULMONARY AND SLEEP MEDICINE    EC-ECHOCARDIOGRAM COMPLETE W/O CONT   2. PAC (premature atrial contraction)     3. Borderline hypertension         Medical Decision Making: Today's Assessment/Status/Plan:   1.  PACs and PAF.  Try low-dose flecainide.  May possibly come off metoprolol.  2.  Anticoagulation.  Patient will consider at a later time.  Start aspirin.  3.  Hypertension well-controlled.  4.  Hyperlipidemia on statins.  5.  Follow-up " with me in 3 months.  6.  Check echocardiogram and sleep study.

## 2021-09-28 ENCOUNTER — APPOINTMENT (OUTPATIENT)
Dept: CARDIOLOGY | Facility: MEDICAL CENTER | Age: 66
End: 2021-09-28
Attending: INTERNAL MEDICINE
Payer: MEDICARE

## 2021-11-05 PROBLEM — G47.33 OSA (OBSTRUCTIVE SLEEP APNEA): Status: ACTIVE | Noted: 2021-11-05

## 2021-11-05 NOTE — PROGRESS NOTES
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 Dimas Orozco MD and himself over concern about possible JACKIE.  Dr. Kimball does not feel that he has an actual sleep problem but had an episode of PAF in 2015 and a second episode in June 2019. Had been dehydrated and using energy drinks. Second one occurred during sleep. He has never been previously evaluated for sleep.  He generally goes to bed at 10:30 and awakens at 7:00.    On flec is mostly in NSR.    His sleep latency is less than 15 minutes.  He may read, bathe, or right just prior to going to turning out the lights and attempting to go to sleep.  He experiences 2 episodes of nocturnal awakening and nocturia each night.  He generally sleeps about 7 hours.  He may occasionally nap.    He denies snoring, restless legs, or leg or arms jerking or twitching during sleep.    His total [place] score is 3 out of 24.    Significant comorbidities and modifying factors include prior nasal fracture sustained during karate which he said himself with a consequent bone spur, paroxysmal atrial fibrillation, borderline hypertension, non-smoker, dyslipidemia on Crestor, normal BMI, and coronary calcium score of 0 up-to-date with SARS-CoV-2 vaccination, up-to-date with 2021 influenza vaccination, up-to-date with Pneumovax 2020.      Patient Active Problem List    Diagnosis Date Noted   • JACKIE (obstructive sleep apnea) 11/05/2021   • PAC (premature atrial contraction) 09/20/2021   • Borderline hypertension 09/20/2021   • Paroxysmal atrial fibrillation (HCC) 02/01/2017       Past Medical History:   Diagnosis Date   • Cardiac arrhythmia    • Chickenpox    • DVT (deep venous thrombosis) (HCC)    • Frequent urination    • Heartburn    • Hypertension    • Influenza    • Palpitations    • Paroxysmal atrial fibrillation (HCC) 2/1/2017   • Ringing in ears    • Tonsillitis    • Wears glasses         Past  Surgical History:   Procedure Laterality Date   • WOUND EXPLORATION ORTHO  8/13/2011    Performed by JORGE ENGLAND at SURGERY [facility]   • IRRIGATION & DEBRIDEMENT ORTHO  8/13/2011    Performed by JORGE ENGLAND at SURGERY [facility]   • ACL REPAIR      bilateral   • ARTHROSCOPY, KNEE     • OTHER      MCL and meniscus repair   • TONSILLECTOMY         Family History   Problem Relation Age of Onset   • Hypertension Father    • Stroke Maternal Grandfather        Social History     Socioeconomic History   • Marital status:      Spouse name: Not on file   • Number of children: Not on file   • Years of education: Not on file   • Highest education level: Professional school degree (e.g., MD, DDS, DVM, ZAFAR)   Occupational History   • Not on file   Tobacco Use   • Smoking status: Never Smoker   • Smokeless tobacco: Never Used   Substance and Sexual Activity   • Alcohol use: No   • Drug use: No   • Sexual activity: Not on file   Other Topics Concern   • Not on file   Social History Narrative   • Not on file     Social Determinants of Health     Financial Resource Strain: Low Risk    • Difficulty of Paying Living Expenses: Not hard at all   Food Insecurity: No Food Insecurity   • Worried About Running Out of Food in the Last Year: Never true   • Ran Out of Food in the Last Year: Never true   Transportation Needs: No Transportation Needs   • Lack of Transportation (Medical): No   • Lack of Transportation (Non-Medical): No   Physical Activity: Sufficiently Active   • Days of Exercise per Week: 4 days   • Minutes of Exercise per Session: 60 min   Stress: No Stress Concern Present   • Feeling of Stress : Only a little   Social Connections: Socially Integrated   • Frequency of Communication with Friends and Family: Three times a week   • Frequency of Social Gatherings with Friends and Family: Patient refused   • Attends Christian Services: More than 4 times per year   • Active Member of Clubs or  "Organizations: Yes   • Attends Club or Organization Meetings: More than 4 times per year   • Marital Status:    Intimate Partner Violence:    • Fear of Current or Ex-Partner: Not on file   • Emotionally Abused: Not on file   • Physically Abused: Not on file   • Sexually Abused: Not on file   Housing Stability: Unknown   • Unable to Pay for Housing in the Last Year: No   • Number of Places Lived in the Last Year: Not on file   • Unstable Housing in the Last Year: No       Current Outpatient Medications   Medication Sig Dispense Refill   • losartan (COZAAR) 100 MG Tab Take 100 mg by mouth every day.     • rosuvastatin (CRESTOR) 10 MG Tab Take 10 mg by mouth every day.     • flecainide (TAMBOCOR) 50 MG tablet Take 1 Tablet by mouth 2 times a day. 180 Tablet 3   • metoprolol (LOPRESSOR) 25 MG Tab Take 0.5 Tabs by mouth every day. 1 Tab 1     No current facility-administered medications for this visit.    \"CURRENT RX\"    ALLERGIES: Amoxicillin, Ampicillin, and Augmentin    ROS    Constitutional: Denies fever, chills, sweats,  weight loss, fatigue.  Eyes: Denies vision loss, pain, drainage, double vision, glasses.  Ears/Nose/Mouth/Throat: Denies earache, difficulty hearing, rhinitis/nasal congestion, injury, recurrent sore throat, persistent hoarseness, decayed teeth/toothaches, ringing or buzzing in the ears.  Cardiovascular: Denies chest pain, tightness, palpitations, swelling in legs/feet, fainting, difficulty breathing when lying down but gets better when sitting up.   Respiratory: Denies shortness of breath, cough, sputum, wheezing, painful breathing, coughing up blood.   Sleep: per HPI  Gastrointestinal: Denies  difficulty swallowing, nausea, abdominal pain, diarrhea, constipation, heartburn.  Genitourinary: Denies  blood in urine, discharge, frequent urination.   Musculoskeletal: Denies painful joints, sore muscles, back pain.   Integumentary: Denies rashes, lumps, color changes.   Neurological: Denies " "frequent headaches,weakness, dizziness.    PHYSICAL EXAM  Normal BMI    /80 (BP Location: Left arm, Patient Position: Sitting, BP Cuff Size: Adult)   Pulse (!) 59   Resp 16   Ht 1.778 m (5' 10\")   Wt 75.8 kg (167 lb)   SpO2 96%   BMI 23.96 kg/m²   Appearance: Well-nourished, well-developed, no acute distress  Eyes:  PERRLA, EOMI  ENMT: masked  Neck: Supple, trachea midline, no masses  Respiratory effort:  No intercostal retractions or use of accessory muscles  Lung auscultation:  No wheezes rhonchi rubs or rales  Cardiac: No murmurs, rubs, or gallops; regular rhythm, normal rate; no edema  Abdomen:  No tenderness, no organomegaly  Musculoskeletal:  Grossly normal; gait and station normal; digits and nails normal  Skin:  No rashes, petechiae, cyanosis  Neurologic: without focal signs; oriented to person, time, place, and purpose; judgement intact  Psychiatric:  No depression, anxiety, agitation        Medical Decision Making:  The medical record was reviewed in its entirety including the referral notes, records from primary care, consultants notes, hospital records, labs, imaging, microbiology, immunology, and immunizations.  Care gaps identified and reviewed with the patient.    Diagnostic and titration nocturnal polysomnograms, home sleep apnea tests, continuous nocturnal oximetry results, multiple sleep latency tests, and compliance reports reviewed.        1. JACKIE (obstructive sleep apnea)    2. Borderline hypertension    3. Paroxysmal atrial fibrillation (HCC)      PLAN:   The patient does not have much in the way of signs or symptoms but has had some 2 episodes of paroxysmal atrial fibrillation.  We will do a home sleep apnea test to evaluate for possible though unlikely JACKIE.      The risks of untreated sleep apnea were discussed with the patient at length. Patients with JACKIE are at increased risk of cardiovascular disease including coronary artery disease, systemic arterial hypertension, pulmonary " arterial hypertension, cardiac arrythmias, and stroke. JACKIE patients have an increased risk of motor vehicle accidents, type 2 diabetes, chronic kidney disease, and non-alcoholic liver disease. The patient was advised to avoid driving a motor vehicle when drowsy.    Positive airway pressure will favorably impact many of the adverse conditions and effects provoked by JACKIE.    Have advised the patient to follow up with the appropriate healthcare practitioners for all other medical problems and issues.    Mask wearing, handwashing, and social distancing protocols reviewed and encouraged.      Return for after sleep study.    Total time 45 minutes

## 2021-11-09 ENCOUNTER — OFFICE VISIT (OUTPATIENT)
Dept: SLEEP MEDICINE | Facility: MEDICAL CENTER | Age: 66
End: 2021-11-09
Payer: MEDICARE

## 2021-11-09 VITALS
OXYGEN SATURATION: 96 % | RESPIRATION RATE: 16 BRPM | BODY MASS INDEX: 23.91 KG/M2 | HEART RATE: 59 BPM | HEIGHT: 70 IN | DIASTOLIC BLOOD PRESSURE: 80 MMHG | WEIGHT: 167 LBS | SYSTOLIC BLOOD PRESSURE: 128 MMHG

## 2021-11-09 DIAGNOSIS — R03.0 BORDERLINE HYPERTENSION: ICD-10-CM

## 2021-11-09 DIAGNOSIS — I48.0 PAROXYSMAL ATRIAL FIBRILLATION (HCC): ICD-10-CM

## 2021-11-09 DIAGNOSIS — G47.33 OSA (OBSTRUCTIVE SLEEP APNEA): ICD-10-CM

## 2021-11-09 PROCEDURE — 99204 OFFICE O/P NEW MOD 45 MIN: CPT | Performed by: INTERNAL MEDICINE

## 2021-11-15 DIAGNOSIS — I48.0 PAROXYSMAL ATRIAL FIBRILLATION (HCC): ICD-10-CM

## 2021-11-17 RX ORDER — FLECAINIDE ACETATE 50 MG/1
50 TABLET ORAL 2 TIMES DAILY
Qty: 180 TABLET | Refills: 2 | Status: SHIPPED | OUTPATIENT
Start: 2021-11-17 | End: 2021-11-17

## 2021-11-19 ENCOUNTER — TELEPHONE (OUTPATIENT)
Dept: CARDIOLOGY | Facility: MEDICAL CENTER | Age: 66
End: 2021-11-19

## 2021-11-19 DIAGNOSIS — I48.0 PAROXYSMAL ATRIAL FIBRILLATION (HCC): ICD-10-CM

## 2021-11-19 NOTE — TELEPHONE ENCOUNTER
DS    Pt needs a refill for:     flecainide (TAMBOCOR) 50 MG tablet [[id number]]  For 60 tablets to go to [facility] [phone], as DS has bumped his mgs to 75 daily, and is having a issue with [facility] to cover cost.  He will pay for himself.     He is down to 5 tablets.       - [phone]    Thank you,   Bessy GALAN

## 2021-11-22 RX ORDER — FLECAINIDE ACETATE 50 MG/1
75 TABLET ORAL 2 TIMES DAILY
Qty: 270 TABLET | Refills: 1 | Status: SHIPPED | OUTPATIENT
Start: 2021-11-22 | End: 2021-12-27

## 2021-11-22 NOTE — TELEPHONE ENCOUNTER
Ok to continue current dose for now, but should also check with DS after he is back in office.      Treva

## 2021-11-22 NOTE — TELEPHONE ENCOUNTER
"Called pt back. He confirms his current dose of flecainide 75mg BID which he had previously discussed with DS. He says \"this dose is working really well.\" His preferred pharmacy is [facility] mail order on file. Advised last prescribed dose 50mg BID, will confirm dose and refill rx. Pt verbalized understanding and was appreciative.  "

## 2021-11-22 NOTE — TELEPHONE ENCOUNTER
PT called back. He stated his phone doesn't recognize the number and sends to  automatically because of Spam Blocker. Please call wife's number instead.     Best Contact Number: [phone]    Thank you,    Yashira MENG

## 2021-11-22 NOTE — TELEPHONE ENCOUNTER
DS    Patient is asking if we can request a 90 day supplyflecainide (TAMBOCOR) 50 MG tablet [[id number]]. Since increasing the dosage it is costing more for the patient out of pocket. He needs a new prescription showing 3 tablets instead of 2 this will help him with the cost.  [facility] not the 75mg.so he is not getting a full supply of the medications. He would like to see what we can do.   Please reach out to him.    Thank You,  Paula RODRIGES

## 2021-12-03 ENCOUNTER — HOSPITAL ENCOUNTER (OUTPATIENT)
Dept: CARDIOLOGY | Facility: MEDICAL CENTER | Age: 66
End: 2021-12-03
Attending: INTERNAL MEDICINE
Payer: MEDICARE

## 2021-12-03 DIAGNOSIS — I48.0 PAROXYSMAL ATRIAL FIBRILLATION (HCC): ICD-10-CM

## 2021-12-03 PROCEDURE — 93306 TTE W/DOPPLER COMPLETE: CPT

## 2021-12-07 LAB
LV EJECT FRACT  99904: 60
LV EJECT FRACT MOD 2C 99903: 58
LV EJECT FRACT MOD 4C 99902: 55.47
LV EJECT FRACT MOD BP 99901: 56.9

## 2021-12-07 PROCEDURE — 93306 TTE W/DOPPLER COMPLETE: CPT | Mod: 26 | Performed by: INTERNAL MEDICINE

## 2021-12-14 ENCOUNTER — PATIENT MESSAGE (OUTPATIENT)
Dept: CARDIOLOGY | Facility: MEDICAL CENTER | Age: 66
End: 2021-12-14

## 2021-12-27 ENCOUNTER — OFFICE VISIT (OUTPATIENT)
Dept: CARDIOLOGY | Facility: MEDICAL CENTER | Age: 66
End: 2021-12-27
Payer: MEDICARE

## 2021-12-27 VITALS
DIASTOLIC BLOOD PRESSURE: 74 MMHG | HEIGHT: 70 IN | WEIGHT: 167.8 LBS | OXYGEN SATURATION: 97 % | RESPIRATION RATE: 14 BRPM | SYSTOLIC BLOOD PRESSURE: 126 MMHG | HEART RATE: 58 BPM | BODY MASS INDEX: 24.02 KG/M2

## 2021-12-27 DIAGNOSIS — E78.2 MIXED HYPERLIPIDEMIA: ICD-10-CM

## 2021-12-27 DIAGNOSIS — G47.33 OSA (OBSTRUCTIVE SLEEP APNEA): ICD-10-CM

## 2021-12-27 DIAGNOSIS — I48.0 PAROXYSMAL ATRIAL FIBRILLATION (HCC): ICD-10-CM

## 2021-12-27 DIAGNOSIS — I10 PRIMARY HYPERTENSION: ICD-10-CM

## 2021-12-27 DIAGNOSIS — R03.0 BORDERLINE HYPERTENSION: ICD-10-CM

## 2021-12-27 PROBLEM — E78.5 HLD (HYPERLIPIDEMIA): Status: ACTIVE | Noted: 2021-12-27

## 2021-12-27 LAB — EKG IMPRESSION: NORMAL

## 2021-12-27 PROCEDURE — 99214 OFFICE O/P EST MOD 30 MIN: CPT | Performed by: INTERNAL MEDICINE

## 2021-12-27 PROCEDURE — 93000 ELECTROCARDIOGRAM COMPLETE: CPT | Performed by: INTERNAL MEDICINE

## 2021-12-27 RX ORDER — FLECAINIDE ACETATE 150 MG/1
75 TABLET ORAL 2 TIMES DAILY
Qty: 90 TABLET | Refills: 3 | Status: SHIPPED | OUTPATIENT
Start: 2021-12-27 | End: 2022-12-06

## 2021-12-27 NOTE — PROGRESS NOTES
Chief Complaint   Patient presents with   • Atrial Fibrillation     F/V Dx: Paroxysmal atrial fibrillation (HCC)   • Premature Atrial Contractions (PACs)       Subjective     Ed Inder Kimball II is a 66 y.o. male who presents today with history of paroxysmal atrial fibrillation with good control on flecainide 75 twice daily.  On low-dose metoprolol.  Chads vasc score of 2.  Hypertension well controlled. On aspirin.  On statins coronary calcium scan 0.    Past Medical History:   Diagnosis Date   • Cardiac arrhythmia    • Chickenpox    • DVT (deep venous thrombosis) (HCC)    • Frequent urination    • Heartburn    • Hypertension    • Influenza    • Palpitations    • Paroxysmal atrial fibrillation (HCC) 2/1/2017   • Ringing in ears    • Tonsillitis    • Wears glasses      Past Surgical History:   Procedure Laterality Date   • WOUND EXPLORATION ORTHO  8/13/2011    Performed by JORGE ENGLAND at SURGERY [facility]   • IRRIGATION & DEBRIDEMENT ORTHO  8/13/2011    Performed by JORGE ENGLAND at SURGERY [facility]   • ARTHROSCOPY, KNEE     • OTHER      MCL and meniscus repair   • REPAIR, KNEE, ACL      bilateral   • TONSILLECTOMY       Family History   Problem Relation Age of Onset   • Hypertension Father    • Stroke Maternal Grandfather      Social History     Socioeconomic History   • Marital status:      Spouse name: Not on file   • Number of children: Not on file   • Years of education: Not on file   • Highest education level: Professional school degree (e.g., MD, DDS, DVM, ZAFAR)   Occupational History   • Not on file   Tobacco Use   • Smoking status: Never Smoker   • Smokeless tobacco: Never Used   Substance and Sexual Activity   • Alcohol use: No   • Drug use: No   • Sexual activity: Not on file   Other Topics Concern   • Not on file   Social History Narrative   • Not on file     Social Determinants of Health     Financial Resource Strain: Low Risk    • Difficulty of Paying Living Expenses:  Not hard at all   Food Insecurity: No Food Insecurity   • Worried About Running Out of Food in the Last Year: Never true   • Ran Out of Food in the Last Year: Never true   Transportation Needs: No Transportation Needs   • Lack of Transportation (Medical): No   • Lack of Transportation (Non-Medical): No   Physical Activity: Sufficiently Active   • Days of Exercise per Week: 4 days   • Minutes of Exercise per Session: 60 min   Stress: No Stress Concern Present   • Feeling of Stress : Only a little   Social Connections: Socially Integrated   • Frequency of Communication with Friends and Family: Three times a week   • Frequency of Social Gatherings with Friends and Family: Patient refused   • Attends Synagogue Services: More than 4 times per year   • Active Member of Clubs or Organizations: Yes   • Attends Club or Organization Meetings: More than 4 times per year   • Marital Status:    Intimate Partner Violence:    • Fear of Current or Ex-Partner: Not on file   • Emotionally Abused: Not on file   • Physically Abused: Not on file   • Sexually Abused: Not on file   Housing Stability: Unknown   • Unable to Pay for Housing in the Last Year: No   • Number of Places Lived in the Last Year: Not on file   • Unstable Housing in the Last Year: No     Allergies   Allergen Reactions   • Amoxicillin    • Ampicillin    • Augmentin Shortness of Breath     Outpatient Encounter Medications as of 12/27/2021   Medication Sig Dispense Refill   • flecainide (TAMBOCOR) 150 MG Tab Take 0.5 Tablets by mouth 2 times a day. 90 Tablet 3   • losartan (COZAAR) 100 MG Tab Take 100 mg by mouth every day.     • rosuvastatin (CRESTOR) 10 MG Tab Take 10 mg by mouth every day.     • metoprolol (LOPRESSOR) 25 MG Tab Take 12.5 mg by mouth every day. Alternating 6.5 po qd & 6.5 po bid. 1 Tab 1   • [DISCONTINUED] flecainide (TAMBOCOR) 50 MG tablet Take 1.5 Tablets by mouth 2 times a day. (Patient taking differently: Take 75 mg by mouth 2 times a day.  "75 MG PO BID.) 270 Tablet 1     No facility-administered encounter medications on file as of 12/27/2021.     ROS           Objective     /74 (BP Location: Left arm, Patient Position: Sitting, BP Cuff Size: Adult)   Pulse (!) 58   Resp 14   Ht 1.778 m (5' 10\")   Wt 76.1 kg (167 lb 12.8 oz)   SpO2 97%   BMI 24.08 kg/m²     Physical Exam  Constitutional:       Appearance: He is well-developed.   HENT:      Head: Normocephalic and atraumatic.   Cardiovascular:      Rate and Rhythm: Normal rate and regular rhythm.      Heart sounds: No murmur heard.  No friction rub. No gallop.    Pulmonary:      Effort: Pulmonary effort is normal.      Breath sounds: Normal breath sounds.   Abdominal:      Palpations: Abdomen is soft.   Musculoskeletal:         General: Normal range of motion.      Cervical back: Normal range of motion and neck supple.   Skin:     General: Skin is warm and dry.   Neurological:      Mental Status: He is alert and oriented to person, place, and time.   Psychiatric:         Behavior: Behavior normal.         Thought Content: Thought content normal.         Judgment: Judgment normal.                Assessment & Plan     1. Paroxysmal atrial fibrillation (HCC)  EKG    flecainide (TAMBOCOR) 150 MG Tab   2. JACKIE (obstructive sleep apnea)     3. Borderline hypertension     4. Mixed hyperlipidemia     5. Primary hypertension         Medical Decision Making: Today's Assessment/Status/Plan:   1.  Paroxysmal atrial fibrillation continue flecainide and low-dose Toprol.  2.  Anticoagulation, continue aspirin defers on NOAC.  3.  Statins does not meet guidelines but continues on it.  4.  Follow-up in 1 year.                   "

## 2021-12-28 ENCOUNTER — HOME STUDY (OUTPATIENT)
Dept: SLEEP MEDICINE | Facility: MEDICAL CENTER | Age: 66
End: 2021-12-28
Attending: INTERNAL MEDICINE
Payer: MEDICARE

## 2021-12-28 DIAGNOSIS — G47.33 OSA (OBSTRUCTIVE SLEEP APNEA): ICD-10-CM

## 2021-12-28 PROCEDURE — G0399 HOME SLEEP TEST/TYPE 3 PORTA: HCPCS | Performed by: INTERNAL MEDICINE

## 2022-01-03 ENCOUNTER — TELEPHONE (OUTPATIENT)
Dept: SLEEP MEDICINE | Facility: MEDICAL CENTER | Age: 67
End: 2022-01-03

## 2022-01-03 NOTE — PROCEDURES
Interpretation:    This home sleep study was performed on 12/28/2021 using a ResMed ApneaLink Air type III device.  The total recording time duration was 8 hours 26 minutes, the monitoring time (flow) duration was 8 hours 13 minutes, and the oxygen saturation evaluation duration was 8 hours 9 minutes.  The overall AURA was 5.3 which is slightly elevated, the supine AURA was 5.9, the nonsupine AURA was 5.0, and the upright AURA was 0.0. The total number of apneas and hypopneas was 38.  The Cheyne- Gimenez respiration time was 30 minutes and the percentage was 6%.  The oxygen desaturation index was 5.4 and the patient experienced 44 total desaturations.    The baseline oxygen saturation was 95%, the average oxygen saturation was 92%, and the lowest oxygen saturation was 78%.  The saturations were less than or equal to 88% for 8% or for 36 minutes.    The patient experienced 6735 breaths or 13.6/min.  90 snores were recorded.  The minimum heart rate was 40, the average heart rate was 46, and the maximum heart rate was 210 bpm      Assessment:    Mild sleep apnea - overall AURA 5.3, supine AURA 5.9.  A home sleep study may underestimate the severity of sleep disordered breathing as total sleep time is unknown and total monitoring time is used as its surrogate.  Mild nocturnal desaturation - dinesh saturation 78% - less than or equal to 88% for 8% of the TIB        Recommendation:    If significant signs, symptoms, and or comorbidities warrant, recommend a positive airway pressure titration. Alternative treatments for OSAH include behavioral modification, use of a dental appliance, and nasopharyngeal reconstructive surgery. Behavior modification includes weight loss, eliminating alcohol and sedatives, and avoiding the supine position. If alternative treatments are used, a follow-up diagnostic study is warranted to ensure efficacy.    Clinical correlation is needed.  An empiric trial of auto titrating CPAP may be an acceptable  olivier.        Dr. Oleg Santoyo M.D.

## 2022-01-03 NOTE — TELEPHONE ENCOUNTER
Pt would like a call back from Dr. Santoyo regarding his HSS.  He would like to discuss options.      - [phone]    Thank you,   Bessy GALAN

## 2022-01-04 PROBLEM — R06.3 CHEYNE-STOKES BREATHING: Status: ACTIVE | Noted: 2022-01-04

## 2022-01-04 PROBLEM — G47.34 NOCTURNAL HYPOXEMIA: Status: ACTIVE | Noted: 2022-01-04

## 2022-01-04 NOTE — PROGRESS NOTES
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 Dimas Orozco MD and himself over concern about possible JACKIE.  Dr. Kimball does not feel that he has an actual sleep problem but had an episode of PAF in 2015 and a second episode in June 2019. Had been dehydrated and using energy drinks. Second one occurred during sleep. He has never been previously evaluated for sleep.  He generally goes to bed at 10:30 and awakens at 7:00.    His sleep latency is less than 15 minutes.  He may read, bathe, or right just prior to going to turning out the lights and attempting to go to sleep.  He experiences 2 episodes of nocturnal awakening and nocturia each night.  He generally sleeps about 7 hours.  He may occasionally nap.     He denies snoring, restless legs, or leg or arms jerking or twitching during sleep.     His total Butler score is 3 out of 24.      His home sleep study was performed on 12/20/2021 and showed an overall AURA of 5.3, a supine AURA of 5.9, a dinesh saturation of 78%, and saturations less than or equal to 88% for 8% of the recording or 36 minutes.  The patient had 30 minutes of Cheyne-Gimenez respirations and the Cheyne-Gimenez respirations percentage was 6%.  The oxygen desaturation index was 5.4 and the patient experienced 44 total desaturations.  The patient had additional crescendo decrescendo breathing episodes which did not meet the strict criteria for Cheyne-Gimenez respirations.    Reviewing the tracings shows that many of the desaturations may be artifactual.    Typically Cheyne-Gimenez respirations are associated with heart failure and stroke.  Although the amount of time he experienced Cheyne-Gimenez respirations was fairly limited.         Significant comorbidities and modifying factors include prior nasal fracture sustained during karate which he fixed himself with a consequent bone spur, paroxysmal atrial fibrillation, borderline  hypertension, non-smoker, dyslipidemia on Crestor, normal BMI, and coronary calcium score of 0, up-to-date with SARS-CoV-2 vaccination, up-to-date with 2021 influenza vaccination, up-to-date with Pneumovax 2020.      Patient Active Problem List    Diagnosis Date Noted   • Nocturnal hypoxemia 01/04/2022   • Cheyne-Gimenez breathing 01/04/2022   • HLD (hyperlipidemia) 12/27/2021   • Hypertension    • JACKIE (obstructive sleep apnea) 11/05/2021   • PAC (premature atrial contraction) 09/20/2021   • Paroxysmal atrial fibrillation (HCC) 02/01/2017       Past Medical History:   Diagnosis Date   • Cardiac arrhythmia    • Chickenpox    • DVT (deep venous thrombosis) ([facility])    • Frequent urination    • Heartburn    • Hypertension    • Influenza    • Palpitations    • Paroxysmal atrial fibrillation (HCC) 2/1/2017   • Ringing in ears    • Tonsillitis    • Wears glasses         Past Surgical History:   Procedure Laterality Date   • WOUND EXPLORATION ORTHO  8/13/2011    Performed by JORGE ENGLAND at SURGERY [facility]   • IRRIGATION & DEBRIDEMENT ORTHO  8/13/2011    Performed by JORGE ENGLAND at SURGERY [facility]   • ARTHROSCOPY, KNEE     • OTHER      MCL and meniscus repair   • REPAIR, KNEE, ACL      bilateral   • TONSILLECTOMY         Family History   Problem Relation Age of Onset   • Hypertension Father    • Stroke Maternal Grandfather        Social History     Socioeconomic History   • Marital status:      Spouse name: Not on file   • Number of children: Not on file   • Years of education: Not on file   • Highest education level: Professional school degree (e.g., MD, DDS, DVM, ZAFAR)   Occupational History   • Not on file   Tobacco Use   • Smoking status: Never Smoker   • Smokeless tobacco: Never Used   Substance and Sexual Activity   • Alcohol use: No   • Drug use: No   • Sexual activity: Not on file   Other Topics Concern   • Not on file   Social History Narrative   • Not on file     Social  "Determinants of Health     Financial Resource Strain: Low Risk    • Difficulty of Paying Living Expenses: Not very hard   Food Insecurity: No Food Insecurity   • Worried About Running Out of Food in the Last Year: Never true   • Ran Out of Food in the Last Year: Never true   Transportation Needs: No Transportation Needs   • Lack of Transportation (Medical): No   • Lack of Transportation (Non-Medical): No   Physical Activity: Sufficiently Active   • Days of Exercise per Week: 6 days   • Minutes of Exercise per Session: 60 min   Stress: Stress Concern Present   • Feeling of Stress : To some extent   Social Connections: Socially Integrated   • Frequency of Communication with Friends and Family: Twice a week   • Frequency of Social Gatherings with Friends and Family: Twice a week   • Attends Taoist Services: More than 4 times per year   • Active Member of Clubs or Organizations: Yes   • Attends Club or Organization Meetings: More than 4 times per year   • Marital Status:    Intimate Partner Violence:    • Fear of Current or Ex-Partner: Not on file   • Emotionally Abused: Not on file   • Physically Abused: Not on file   • Sexually Abused: Not on file   Housing Stability: Low Risk    • Unable to Pay for Housing in the Last Year: No   • Number of Places Lived in the Last Year: 1   • Unstable Housing in the Last Year: No       Current Outpatient Medications   Medication Sig Dispense Refill   • flecainide (TAMBOCOR) 150 MG Tab Take 0.5 Tablets by mouth 2 times a day. 90 Tablet 3   • losartan (COZAAR) 100 MG Tab Take 100 mg by mouth every day.     • rosuvastatin (CRESTOR) 10 MG Tab Take 10 mg by mouth every day. Every other day     • metoprolol (LOPRESSOR) 25 MG Tab Take 12.5 mg by mouth every day. Alternating 6.5 po qd & 6.5 po bid. 1 Tab 1     No current facility-administered medications for this visit.    \"CURRENT RX\"    ALLERGIES: Amoxicillin, Ampicillin, and Augmentin    ROS    Constitutional: Denies fever, " "chills, sweats,  weight loss, fatigue  Cardiovascular: Denies chest pain, tightness, palpitations, swelling in legs/feet, fainting, difficulty breathing when lying down but gets better when sitting up.   Respiratory: Denies shortness of breath, cough, sputum, wheezing, painful breathing, coughing up blood.   Sleep: per HPI  Gastrointestinal: Denies  difficulty swallowing, nausea, abdominal pain, diarrhea, constipation, heartburn.  Musculoskeletal: Denies painful joints, sore muscles, back pain.        PHYSICAL EXAM  Normal BMI    /70 (BP Location: Left arm, Patient Position: Sitting, BP Cuff Size: Adult)   Pulse 60   Resp 14   Ht 1.778 m (5' 10\")   Wt 75.5 kg (166 lb 6.4 oz)   SpO2 97%   BMI 23.88 kg/m²   Appearance: Well-nourished, well-developed, no acute distress  Eyes:  PERRLA, EOMI  ENMT: masked  Neck: Supple, trachea midline, no masses  Respiratory effort:  No intercostal retractions or use of accessory muscles  Lung auscultation:  No wheezes rhonchi rubs or rales  Cardiac: No murmurs, rubs, or gallops; regular rhythm, normal rate; no edema  Abdomen:  No tenderness, no organomegaly.  Musculoskeletal:  Grossly normal; gait and station normal; digits and nails normal  Skin:  No rashes, petechiae, cyanosis  Neurologic: without focal signs; oriented to person, time, place, and purpose; judgement intact  Psychiatric:  No depression, anxiety, agitation      Medical Decision Making       The medical record was reviewed in its entirety including the referral notes, records from primary care, consultants notes, hospital records, lab, imaging, microbiology, immunology, and immunizations.  Care gaps identified and reviewed with the patient.    Diagnostic and titration nocturnal polysomnogram's, home sleep apnea tests, continuous nocturnal oximetry results, multiple sleep latency tests, and compliance reports reviewed.  1. JACKIE (obstructive sleep apnea)    2. Nocturnal hypoxemia    3. Cheyne-Gimenez " breathing      PLAN:   There is enough artifact that he may not have his bad JACKIE and desaturations as the HST summary indicates.  However we do have unexplained Cheyne-Gimenez respirations and he has had 2 episodes of A. fib albeit likely precipitated by sodas and energy drinks.    I think he would be best served by an attended diagnostic PSG with end-tidal CO2 monitoring to see if we could associate any Cheyne-Gimenez with hyperventilation (which could drop the PCO2 below the apneic threshold).  Clearly if he has any A. fib we will schedule this sooner rather than later.  He will communicate with us in a month or 2 and we will order an attended study at that time or sooner if need be.    The risks of untreated sleep apnea were discussed with the patient at length. Patients with JACKIE are at increased risk of cardiovascular disease including coronary artery disease, systemic arterial hypertension, pulmonary arterial hypertension, cardiac arrythmias, and stroke. JACKIE patients have an increased risk of motor vehicle accidents, type 2 diabetes, chronic kidney disease, and non-alcoholic liver disease. The patient was advised to avoid driving a motor vehicle when drowsy.    Positive airway pressure will favorably impact many of the adverse conditions and effects provoked by JACKIE.    Have advised the patient to follow up with the appropriate healthcare practitioners for all other medical problems and issues.    Mask wearing, handwashing, and social distancing protocols reviewed and encouraged.    Return He will follow-up after an attended PSG which has not been yet ordered..      Total time 30 minutes

## 2022-01-06 ENCOUNTER — OFFICE VISIT (OUTPATIENT)
Dept: SLEEP MEDICINE | Facility: MEDICAL CENTER | Age: 67
End: 2022-01-06
Payer: MEDICARE

## 2022-01-06 VITALS
OXYGEN SATURATION: 97 % | WEIGHT: 166.4 LBS | HEIGHT: 70 IN | RESPIRATION RATE: 14 BRPM | BODY MASS INDEX: 23.82 KG/M2 | DIASTOLIC BLOOD PRESSURE: 70 MMHG | SYSTOLIC BLOOD PRESSURE: 128 MMHG | HEART RATE: 60 BPM

## 2022-01-06 DIAGNOSIS — G47.34 NOCTURNAL HYPOXEMIA: ICD-10-CM

## 2022-01-06 DIAGNOSIS — R06.3 CHEYNE-STOKES BREATHING: ICD-10-CM

## 2022-01-06 DIAGNOSIS — G47.33 OSA (OBSTRUCTIVE SLEEP APNEA): ICD-10-CM

## 2022-01-06 PROCEDURE — 99214 OFFICE O/P EST MOD 30 MIN: CPT | Performed by: INTERNAL MEDICINE

## 2022-01-06 ASSESSMENT — PATIENT HEALTH QUESTIONNAIRE - PHQ9: CLINICAL INTERPRETATION OF PHQ2 SCORE: 0

## 2022-03-14 ENCOUNTER — TELEPHONE (OUTPATIENT)
Dept: CARDIOLOGY | Facility: MEDICAL CENTER | Age: 67
End: 2022-03-14
Payer: MEDICARE

## 2022-03-14 DIAGNOSIS — G47.33 OSA (OBSTRUCTIVE SLEEP APNEA): ICD-10-CM

## 2022-03-14 NOTE — TELEPHONE ENCOUNTER
Pt called, he is wanting to schedule his Sleep Study and is looking for his referral from Dr. Santoyo.     Ed - [phone]    Thank you,   Bessy GONZALEZ

## 2022-03-14 NOTE — TELEPHONE ENCOUNTER
"Pt ready to proceed with in lab testing, notes state, \"attended diagnostic PSG with end-tidal CO2 monitoring to see if we could associate any Cheyne-Gimneez with hyperventilation\"    Please sign order.   "

## 2022-04-16 ENCOUNTER — APPOINTMENT (OUTPATIENT)
Dept: SLEEP MEDICINE | Facility: MEDICAL CENTER | Age: 67
End: 2022-04-16
Payer: MEDICARE

## 2022-05-01 ENCOUNTER — SLEEP STUDY (OUTPATIENT)
Dept: SLEEP MEDICINE | Facility: MEDICAL CENTER | Age: 67
End: 2022-05-01
Payer: MEDICARE

## 2022-05-01 DIAGNOSIS — G47.33 OSA (OBSTRUCTIVE SLEEP APNEA): ICD-10-CM

## 2022-05-01 PROCEDURE — 95810 POLYSOM 6/> YRS 4/> PARAM: CPT | Mod: 52 | Performed by: INTERNAL MEDICINE

## 2022-05-08 NOTE — PROCEDURES
MONTAGE: Standard    STUDY TYPE: Diagnostic    RECORDING TECHNIQUE:   After the scalp was prepared, gold plated electrodes were applied to the scalp according to the International 10-20 System. EEG (electroencephalogram) was continuously monitored from the O1-M2, O2-M1, C3-M2, C4-M1, F3-M2, and F4-M1. EOGs (electrooculograms) were monitored by electrodes placed at the left and right outer canthi. Chin EMG (electromyogram) was monitored by electrodes placed on the mentalis and sub-mentalis muscles. Nasal and oral airflow were monitored using a triple port thermocouple as well as oronasal pressure transducer. Respiratory effort was measured by inductive plethysmography technology employing abdominal and thoracic belts. Blood oxygen saturation and pulse were monitored by pulse oximetry. Heart rhythm was monitored by surface electrocardiogram. Leg EMG was studied using surface electrodes placed on left and right anterior tibialis. A microphone was used to monitor tracheal sounds and snoring. Body position was monitored and documented by technician observation.     SCORING CRITERIA:   A modification of the AASM manual for scoring of sleep and associated events was used. Obstructive apneas were scored by cessation of airflow for at least 10 seconds with continuing respiratory effort. Central apneas were scored by cessation of airflow for at least 10 seconds with no respiratory effort. Hypopneas were scored by a 30% or more reduction in airflow for at least 10 seconds accompanied by arterial oxygen desaturation of 3% or an arousal. For CMS (Medicare) patients, per AASM rule 1B, hypopneas are scored by 30% with mild reduction in airflow for at least 10 seconds accompanied by arterial saturation decreased at 4%.    Study start time was 11:18:24 PM. Diagnostic recording time was 5h 35.5m with a total sleep time of 3h 54.5m resulting in a sleep efficiency of 69.90%%. Sleep latency from the start of the study was 09 minutes and  the latency from sleep to REM was 108 minutes. In total,72 arousals were scored for an arousal index of 18.4.    Respiratory:  There were a total of 0 apneas consisting of 0 obstructive apneas, 0 mixed apneas, and 0 central apneas. A total of 4 hypopneas were scored. The apnea index was 0.00 per hour and the hypopnea index was 1.02 per hour resulting in an overall AHI of 1.02. AHI during rem was 3.0 and AHI while supine was 1.70.  Oximetry:  There was a mean oxygen saturation of 94.0%. The minimum oxygen saturation in NREM was 90.0 % and in REM was 90.0. The patient spent 0.0 minutes of TST with SaO2 <88%.  Cardiac:  The highest heart rate seen while awake was 85 BPM while the highest heart rate during sleep was 63 BPM with an average sleeping heart rate of 46 BPM.  Limb Movements:  There were a total of 0 PLMs during sleep which resulted in a PLMS index of 0.0. Of these, 2 were associated with arousals which resulted in a PLMS arousal index of 0.5.      ASSESSMENT:    No obstructive sleep apnea hypopnea - AHI 1.0 which is normal  No significant nocturnal desaturation - dinesh saturation 90% - saturations less than or equal to 88% for 0% of the TST  No central apneas or hypopneas were scored  Unlike his home sleep study, no Cheyne-Gimenez respirations identified          RECOMMENDATION:    There is no indication for PAP therapy or supplemental nocturnal oxygen.  Clinical correlation is needed.          Dr. Oleg Santoyo MD

## 2022-07-28 ENCOUNTER — HOSPITAL ENCOUNTER (OUTPATIENT)
Facility: MEDICAL CENTER | Age: 67
End: 2022-07-28
Attending: EMERGENCY MEDICINE | Admitting: SURGERY
Payer: MEDICARE

## 2022-07-28 ENCOUNTER — ANESTHESIA EVENT (OUTPATIENT)
Dept: SURGERY | Facility: MEDICAL CENTER | Age: 67
End: 2022-07-28
Payer: MEDICARE

## 2022-07-28 ENCOUNTER — ANESTHESIA (OUTPATIENT)
Dept: SURGERY | Facility: MEDICAL CENTER | Age: 67
End: 2022-07-28
Payer: MEDICARE

## 2022-07-28 ENCOUNTER — APPOINTMENT (OUTPATIENT)
Dept: RADIOLOGY | Facility: MEDICAL CENTER | Age: 67
End: 2022-07-28
Attending: EMERGENCY MEDICINE
Payer: MEDICARE

## 2022-07-28 VITALS
HEIGHT: 70 IN | BODY MASS INDEX: 24.27 KG/M2 | RESPIRATION RATE: 18 BRPM | HEART RATE: 55 BPM | SYSTOLIC BLOOD PRESSURE: 126 MMHG | WEIGHT: 169.53 LBS | DIASTOLIC BLOOD PRESSURE: 71 MMHG | TEMPERATURE: 96.8 F | OXYGEN SATURATION: 91 %

## 2022-07-28 DIAGNOSIS — D72.829 LEUKOCYTOSIS, UNSPECIFIED TYPE: ICD-10-CM

## 2022-07-28 DIAGNOSIS — R10.31 PAIN, ABDOMINAL, RLQ: ICD-10-CM

## 2022-07-28 DIAGNOSIS — R11.0 NAUSEA: ICD-10-CM

## 2022-07-28 DIAGNOSIS — K35.30 ACUTE APPENDICITIS WITH LOCALIZED PERITONITIS, WITHOUT PERFORATION, ABSCESS, OR GANGRENE: ICD-10-CM

## 2022-07-28 DIAGNOSIS — R50.9 FEVER, UNSPECIFIED FEVER CAUSE: ICD-10-CM

## 2022-07-28 PROBLEM — K35.80 ACUTE APPENDICITIS: Status: ACTIVE | Noted: 2022-07-28

## 2022-07-28 LAB
ALBUMIN SERPL BCP-MCNC: 4 G/DL (ref 3.2–4.9)
ALBUMIN/GLOB SERPL: 1.1 G/DL
ALP SERPL-CCNC: 78 U/L (ref 30–99)
ALT SERPL-CCNC: 14 U/L (ref 2–50)
ANION GAP SERPL CALC-SCNC: 13 MMOL/L (ref 7–16)
AST SERPL-CCNC: 19 U/L (ref 12–45)
BASOPHILS # BLD AUTO: 0.3 % (ref 0–1.8)
BASOPHILS # BLD: 0.05 K/UL (ref 0–0.12)
BILIRUB SERPL-MCNC: 1.6 MG/DL (ref 0.1–1.5)
BUN SERPL-MCNC: 17 MG/DL (ref 8–22)
CALCIUM SERPL-MCNC: 9.2 MG/DL (ref 8.4–10.2)
CHLORIDE SERPL-SCNC: 100 MMOL/L (ref 96–112)
CO2 SERPL-SCNC: 22 MMOL/L (ref 20–33)
CREAT SERPL-MCNC: 1.1 MG/DL (ref 0.5–1.4)
EKG IMPRESSION: NORMAL
EOSINOPHIL # BLD AUTO: 0.02 K/UL (ref 0–0.51)
EOSINOPHIL NFR BLD: 0.1 % (ref 0–6.9)
ERYTHROCYTE [DISTWIDTH] IN BLOOD BY AUTOMATED COUNT: 41.2 FL (ref 35.9–50)
GFR SERPLBLD CREATININE-BSD FMLA CKD-EPI: 73 ML/MIN/1.73 M 2
GLOBULIN SER CALC-MCNC: 3.7 G/DL (ref 1.9–3.5)
GLUCOSE SERPL-MCNC: 116 MG/DL (ref 65–99)
HCT VFR BLD AUTO: 47.4 % (ref 42–52)
HGB BLD-MCNC: 16 G/DL (ref 14–18)
IMM GRANULOCYTES # BLD AUTO: 0.1 K/UL (ref 0–0.11)
IMM GRANULOCYTES NFR BLD AUTO: 0.5 % (ref 0–0.9)
LACTATE BLD-SCNC: 1.5 MMOL/L (ref 0.5–2)
LIPASE SERPL-CCNC: 29 U/L (ref 7–58)
LYMPHOCYTES # BLD AUTO: 3.33 K/UL (ref 1–4.8)
LYMPHOCYTES NFR BLD: 18 % (ref 22–41)
MCH RBC QN AUTO: 30.7 PG (ref 27–33)
MCHC RBC AUTO-ENTMCNC: 33.8 G/DL (ref 33.7–35.3)
MCV RBC AUTO: 90.8 FL (ref 81.4–97.8)
MONOCYTES # BLD AUTO: 1.2 K/UL (ref 0–0.85)
MONOCYTES NFR BLD AUTO: 6.5 % (ref 0–13.4)
NEUTROPHILS # BLD AUTO: 13.76 K/UL (ref 1.82–7.42)
NEUTROPHILS NFR BLD: 74.6 % (ref 44–72)
NRBC # BLD AUTO: 0 K/UL
NRBC BLD-RTO: 0 /100 WBC
PATHOLOGY CONSULT NOTE: NORMAL
PATHOLOGY CONSULT NOTE: NORMAL
PLATELET # BLD AUTO: 223 K/UL (ref 164–446)
PMV BLD AUTO: 10.1 FL (ref 9–12.9)
POTASSIUM SERPL-SCNC: 4.3 MMOL/L (ref 3.6–5.5)
PROT SERPL-MCNC: 7.7 G/DL (ref 6–8.2)
RBC # BLD AUTO: 5.22 M/UL (ref 4.7–6.1)
SODIUM SERPL-SCNC: 135 MMOL/L (ref 135–145)
WBC # BLD AUTO: 18.5 K/UL (ref 4.8–10.8)

## 2022-07-28 PROCEDURE — 80053 COMPREHEN METABOLIC PANEL: CPT

## 2022-07-28 PROCEDURE — 96365 THER/PROPH/DIAG IV INF INIT: CPT

## 2022-07-28 PROCEDURE — 160035 HCHG PACU - 1ST 60 MINS PHASE I: Performed by: SURGERY

## 2022-07-28 PROCEDURE — 700111 HCHG RX REV CODE 636 W/ 250 OVERRIDE (IP): Performed by: STUDENT IN AN ORGANIZED HEALTH CARE EDUCATION/TRAINING PROGRAM

## 2022-07-28 PROCEDURE — 36415 COLL VENOUS BLD VENIPUNCTURE: CPT

## 2022-07-28 PROCEDURE — A9270 NON-COVERED ITEM OR SERVICE: HCPCS | Performed by: STUDENT IN AN ORGANIZED HEALTH CARE EDUCATION/TRAINING PROGRAM

## 2022-07-28 PROCEDURE — 700117 HCHG RX CONTRAST REV CODE 255: Performed by: EMERGENCY MEDICINE

## 2022-07-28 PROCEDURE — 160029 HCHG SURGERY MINUTES - 1ST 30 MINS LEVEL 4: Performed by: SURGERY

## 2022-07-28 PROCEDURE — 700101 HCHG RX REV CODE 250: Performed by: STUDENT IN AN ORGANIZED HEALTH CARE EDUCATION/TRAINING PROGRAM

## 2022-07-28 PROCEDURE — 700105 HCHG RX REV CODE 258: Performed by: EMERGENCY MEDICINE

## 2022-07-28 PROCEDURE — 160048 HCHG OR STATISTICAL LEVEL 1-5: Performed by: SURGERY

## 2022-07-28 PROCEDURE — 160002 HCHG RECOVERY MINUTES (STAT): Performed by: SURGERY

## 2022-07-28 PROCEDURE — 700111 HCHG RX REV CODE 636 W/ 250 OVERRIDE (IP): Performed by: EMERGENCY MEDICINE

## 2022-07-28 PROCEDURE — 93005 ELECTROCARDIOGRAM TRACING: CPT | Performed by: SURGERY

## 2022-07-28 PROCEDURE — 700101 HCHG RX REV CODE 250: Performed by: EMERGENCY MEDICINE

## 2022-07-28 PROCEDURE — 99291 CRITICAL CARE FIRST HOUR: CPT

## 2022-07-28 PROCEDURE — 00840 ANES IPER PX LOWER ABD NOS: CPT | Performed by: STUDENT IN AN ORGANIZED HEALTH CARE EDUCATION/TRAINING PROGRAM

## 2022-07-28 PROCEDURE — G0378 HOSPITAL OBSERVATION PER HR: HCPCS

## 2022-07-28 PROCEDURE — 74177 CT ABD & PELVIS W/CONTRAST: CPT | Mod: ME

## 2022-07-28 PROCEDURE — 88304 TISSUE EXAM BY PATHOLOGIST: CPT

## 2022-07-28 PROCEDURE — 160009 HCHG ANES TIME/MIN: Performed by: SURGERY

## 2022-07-28 PROCEDURE — 700101 HCHG RX REV CODE 250: Performed by: SURGERY

## 2022-07-28 PROCEDURE — 160025 RECOVERY II MINUTES (STATS): Performed by: SURGERY

## 2022-07-28 PROCEDURE — 700105 HCHG RX REV CODE 258: Performed by: SURGERY

## 2022-07-28 PROCEDURE — 99140 ANES COMP EMERGENCY COND: CPT | Performed by: STUDENT IN AN ORGANIZED HEALTH CARE EDUCATION/TRAINING PROGRAM

## 2022-07-28 PROCEDURE — 160041 HCHG SURGERY MINUTES - EA ADDL 1 MIN LEVEL 4: Performed by: SURGERY

## 2022-07-28 PROCEDURE — 85025 COMPLETE CBC W/AUTO DIFF WBC: CPT

## 2022-07-28 PROCEDURE — 160046 HCHG PACU - 1ST 60 MINS PHASE II: Performed by: SURGERY

## 2022-07-28 PROCEDURE — 700102 HCHG RX REV CODE 250 W/ 637 OVERRIDE(OP): Performed by: STUDENT IN AN ORGANIZED HEALTH CARE EDUCATION/TRAINING PROGRAM

## 2022-07-28 PROCEDURE — 83690 ASSAY OF LIPASE: CPT

## 2022-07-28 PROCEDURE — 83605 ASSAY OF LACTIC ACID: CPT

## 2022-07-28 PROCEDURE — 96375 TX/PRO/DX INJ NEW DRUG ADDON: CPT

## 2022-07-28 PROCEDURE — 93010 ELECTROCARDIOGRAM REPORT: CPT | Performed by: INTERNAL MEDICINE

## 2022-07-28 RX ORDER — ACETAMINOPHEN 325 MG/1
TABLET ORAL PRN
Status: DISCONTINUED | OUTPATIENT
Start: 2022-07-28 | End: 2022-07-28 | Stop reason: SURG

## 2022-07-28 RX ORDER — CEFAZOLIN SODIUM 1 G/3ML
INJECTION, POWDER, FOR SOLUTION INTRAMUSCULAR; INTRAVENOUS PRN
Status: DISCONTINUED | OUTPATIENT
Start: 2022-07-28 | End: 2022-07-28 | Stop reason: SURG

## 2022-07-28 RX ORDER — SODIUM CHLORIDE, SODIUM LACTATE, POTASSIUM CHLORIDE, CALCIUM CHLORIDE 600; 310; 30; 20 MG/100ML; MG/100ML; MG/100ML; MG/100ML
INJECTION, SOLUTION INTRAVENOUS CONTINUOUS
Status: DISCONTINUED | OUTPATIENT
Start: 2022-07-28 | End: 2022-07-28 | Stop reason: HOSPADM

## 2022-07-28 RX ORDER — ONDANSETRON 2 MG/ML
4 INJECTION INTRAMUSCULAR; INTRAVENOUS
Status: DISCONTINUED | OUTPATIENT
Start: 2022-07-28 | End: 2022-07-28 | Stop reason: HOSPADM

## 2022-07-28 RX ORDER — OXYCODONE HCL 5 MG/5 ML
5 SOLUTION, ORAL ORAL
Status: DISCONTINUED | OUTPATIENT
Start: 2022-07-28 | End: 2022-07-28 | Stop reason: HOSPADM

## 2022-07-28 RX ORDER — SCOLOPAMINE TRANSDERMAL SYSTEM 1 MG/1
PATCH, EXTENDED RELEASE TRANSDERMAL PRN
Status: DISCONTINUED | OUTPATIENT
Start: 2022-07-28 | End: 2022-07-28 | Stop reason: SURG

## 2022-07-28 RX ORDER — HYDROMORPHONE HYDROCHLORIDE 1 MG/ML
0.1 INJECTION, SOLUTION INTRAMUSCULAR; INTRAVENOUS; SUBCUTANEOUS
Status: DISCONTINUED | OUTPATIENT
Start: 2022-07-28 | End: 2022-07-28 | Stop reason: HOSPADM

## 2022-07-28 RX ORDER — HYDRALAZINE HYDROCHLORIDE 20 MG/ML
5 INJECTION INTRAMUSCULAR; INTRAVENOUS
Status: DISCONTINUED | OUTPATIENT
Start: 2022-07-28 | End: 2022-07-28 | Stop reason: HOSPADM

## 2022-07-28 RX ORDER — HYDROMORPHONE HYDROCHLORIDE 1 MG/ML
0.2 INJECTION, SOLUTION INTRAMUSCULAR; INTRAVENOUS; SUBCUTANEOUS
Status: DISCONTINUED | OUTPATIENT
Start: 2022-07-28 | End: 2022-07-28 | Stop reason: HOSPADM

## 2022-07-28 RX ORDER — DEXAMETHASONE SODIUM PHOSPHATE 4 MG/ML
INJECTION, SOLUTION INTRA-ARTICULAR; INTRALESIONAL; INTRAMUSCULAR; INTRAVENOUS; SOFT TISSUE PRN
Status: DISCONTINUED | OUTPATIENT
Start: 2022-07-28 | End: 2022-07-28 | Stop reason: SURG

## 2022-07-28 RX ORDER — ONDANSETRON 2 MG/ML
INJECTION INTRAMUSCULAR; INTRAVENOUS PRN
Status: DISCONTINUED | OUTPATIENT
Start: 2022-07-28 | End: 2022-07-28 | Stop reason: SURG

## 2022-07-28 RX ORDER — HYDROMORPHONE HYDROCHLORIDE 1 MG/ML
0.4 INJECTION, SOLUTION INTRAMUSCULAR; INTRAVENOUS; SUBCUTANEOUS
Status: DISCONTINUED | OUTPATIENT
Start: 2022-07-28 | End: 2022-07-28 | Stop reason: HOSPADM

## 2022-07-28 RX ORDER — SODIUM CHLORIDE, SODIUM LACTATE, POTASSIUM CHLORIDE, CALCIUM CHLORIDE 600; 310; 30; 20 MG/100ML; MG/100ML; MG/100ML; MG/100ML
1000 INJECTION, SOLUTION INTRAVENOUS ONCE
Status: COMPLETED | OUTPATIENT
Start: 2022-07-28 | End: 2022-07-28

## 2022-07-28 RX ORDER — MEPERIDINE HYDROCHLORIDE 25 MG/ML
12.5 INJECTION INTRAMUSCULAR; INTRAVENOUS; SUBCUTANEOUS
Status: DISCONTINUED | OUTPATIENT
Start: 2022-07-28 | End: 2022-07-28 | Stop reason: HOSPADM

## 2022-07-28 RX ORDER — LABETALOL HYDROCHLORIDE 5 MG/ML
5 INJECTION, SOLUTION INTRAVENOUS
Status: DISCONTINUED | OUTPATIENT
Start: 2022-07-28 | End: 2022-07-28 | Stop reason: HOSPADM

## 2022-07-28 RX ORDER — METOPROLOL TARTRATE 1 MG/ML
1 INJECTION, SOLUTION INTRAVENOUS
Status: DISCONTINUED | OUTPATIENT
Start: 2022-07-28 | End: 2022-07-28 | Stop reason: HOSPADM

## 2022-07-28 RX ORDER — HALOPERIDOL 5 MG/ML
1 INJECTION INTRAMUSCULAR
Status: DISCONTINUED | OUTPATIENT
Start: 2022-07-28 | End: 2022-07-28 | Stop reason: HOSPADM

## 2022-07-28 RX ORDER — GABAPENTIN 300 MG/1
CAPSULE ORAL
Status: COMPLETED
Start: 2022-07-28 | End: 2022-07-28

## 2022-07-28 RX ORDER — METRONIDAZOLE 500 MG/100ML
500 INJECTION, SOLUTION INTRAVENOUS ONCE
Status: COMPLETED | OUTPATIENT
Start: 2022-07-28 | End: 2022-07-28

## 2022-07-28 RX ORDER — ALBUTEROL SULFATE 2.5 MG/3ML
2.5 SOLUTION RESPIRATORY (INHALATION)
Status: DISCONTINUED | OUTPATIENT
Start: 2022-07-28 | End: 2022-07-28 | Stop reason: HOSPADM

## 2022-07-28 RX ORDER — ACETAMINOPHEN 500 MG
TABLET ORAL
Status: COMPLETED
Start: 2022-07-28 | End: 2022-07-28

## 2022-07-28 RX ORDER — HYDROMORPHONE HYDROCHLORIDE 2 MG/ML
INJECTION, SOLUTION INTRAMUSCULAR; INTRAVENOUS; SUBCUTANEOUS PRN
Status: DISCONTINUED | OUTPATIENT
Start: 2022-07-28 | End: 2022-07-28 | Stop reason: SURG

## 2022-07-28 RX ORDER — ONDANSETRON 2 MG/ML
4 INJECTION INTRAMUSCULAR; INTRAVENOUS ONCE
Status: DISCONTINUED | OUTPATIENT
Start: 2022-07-28 | End: 2022-07-28 | Stop reason: HOSPADM

## 2022-07-28 RX ORDER — OXYCODONE HYDROCHLORIDE AND ACETAMINOPHEN 5; 325 MG/1; MG/1
1 TABLET ORAL EVERY 6 HOURS PRN
Qty: 20 TABLET | Refills: 0 | Status: SHIPPED | OUTPATIENT
Start: 2022-07-28 | End: 2022-08-02

## 2022-07-28 RX ORDER — SCOLOPAMINE TRANSDERMAL SYSTEM 1 MG/1
PATCH, EXTENDED RELEASE TRANSDERMAL
Status: COMPLETED
Start: 2022-07-28 | End: 2022-07-28

## 2022-07-28 RX ORDER — GABAPENTIN 300 MG/1
CAPSULE ORAL PRN
Status: DISCONTINUED | OUTPATIENT
Start: 2022-07-28 | End: 2022-07-28 | Stop reason: SURG

## 2022-07-28 RX ORDER — OXYCODONE HCL 5 MG/5 ML
10 SOLUTION, ORAL ORAL
Status: DISCONTINUED | OUTPATIENT
Start: 2022-07-28 | End: 2022-07-28 | Stop reason: HOSPADM

## 2022-07-28 RX ORDER — CEFTRIAXONE 2 G/1
2 INJECTION, POWDER, FOR SOLUTION INTRAMUSCULAR; INTRAVENOUS ONCE
Status: COMPLETED | OUTPATIENT
Start: 2022-07-28 | End: 2022-07-28

## 2022-07-28 RX ORDER — BUPIVACAINE HYDROCHLORIDE AND EPINEPHRINE 5; 5 MG/ML; UG/ML
INJECTION, SOLUTION PERINEURAL
Status: DISCONTINUED | OUTPATIENT
Start: 2022-07-28 | End: 2022-07-28 | Stop reason: HOSPADM

## 2022-07-28 RX ORDER — TRIAMCINOLONE ACETONIDE 55 UG/1
1-2 SPRAY, METERED NASAL
COMMUNITY
End: 2023-01-18

## 2022-07-28 RX ORDER — SODIUM CHLORIDE, SODIUM LACTATE, POTASSIUM CHLORIDE, CALCIUM CHLORIDE 600; 310; 30; 20 MG/100ML; MG/100ML; MG/100ML; MG/100ML
INJECTION, SOLUTION INTRAVENOUS CONTINUOUS
Status: ACTIVE | OUTPATIENT
Start: 2022-07-28 | End: 2022-07-28

## 2022-07-28 RX ORDER — MORPHINE SULFATE 4 MG/ML
4 INJECTION INTRAVENOUS
Status: DISCONTINUED | OUTPATIENT
Start: 2022-07-28 | End: 2022-07-28 | Stop reason: HOSPADM

## 2022-07-28 RX ORDER — DIPHENHYDRAMINE HYDROCHLORIDE 50 MG/ML
12.5 INJECTION INTRAMUSCULAR; INTRAVENOUS
Status: DISCONTINUED | OUTPATIENT
Start: 2022-07-28 | End: 2022-07-28 | Stop reason: HOSPADM

## 2022-07-28 RX ADMIN — ONDANSETRON 4 MG: 2 INJECTION INTRAMUSCULAR; INTRAVENOUS at 11:59

## 2022-07-28 RX ADMIN — DEXAMETHASONE SODIUM PHOSPHATE 4 MG: 4 INJECTION, SOLUTION INTRA-ARTICULAR; INTRALESIONAL; INTRAMUSCULAR; INTRAVENOUS; SOFT TISSUE at 11:43

## 2022-07-28 RX ADMIN — PROPOFOL 50 MG: 10 INJECTION, EMULSION INTRAVENOUS at 12:01

## 2022-07-28 RX ADMIN — CEFTRIAXONE SODIUM 2 G: 2 INJECTION, POWDER, FOR SOLUTION INTRAMUSCULAR; INTRAVENOUS at 06:04

## 2022-07-28 RX ADMIN — ACETAMINOPHEN 1000 MG: 325 TABLET, FILM COATED ORAL at 11:33

## 2022-07-28 RX ADMIN — SODIUM CHLORIDE, POTASSIUM CHLORIDE, SODIUM LACTATE AND CALCIUM CHLORIDE: 600; 310; 30; 20 INJECTION, SOLUTION INTRAVENOUS at 11:39

## 2022-07-28 RX ADMIN — HYDROMORPHONE HYDROCHLORIDE 0.4 MG: 2 INJECTION INTRAMUSCULAR; INTRAVENOUS; SUBCUTANEOUS at 11:41

## 2022-07-28 RX ADMIN — FENTANYL CITRATE 100 MCG: 50 INJECTION, SOLUTION INTRAMUSCULAR; INTRAVENOUS at 11:41

## 2022-07-28 RX ADMIN — EPHEDRINE SULFATE 10 MG: 50 INJECTION INTRAMUSCULAR; INTRAVENOUS; SUBCUTANEOUS at 11:54

## 2022-07-28 RX ADMIN — MIDAZOLAM 2 MG: 1 INJECTION INTRAMUSCULAR; INTRAVENOUS at 11:35

## 2022-07-28 RX ADMIN — CEFAZOLIN 2 G: 330 INJECTION, POWDER, FOR SOLUTION INTRAMUSCULAR; INTRAVENOUS at 11:49

## 2022-07-28 RX ADMIN — SODIUM CHLORIDE, POTASSIUM CHLORIDE, SODIUM LACTATE AND CALCIUM CHLORIDE 1000 ML: 600; 310; 30; 20 INJECTION, SOLUTION INTRAVENOUS at 04:07

## 2022-07-28 RX ADMIN — GABAPENTIN 300 MG: 300 CAPSULE ORAL at 11:33

## 2022-07-28 RX ADMIN — IOHEXOL 100 ML: 350 INJECTION, SOLUTION INTRAVENOUS at 04:41

## 2022-07-28 RX ADMIN — DIPHENHYDRAMINE HYDROCHLORIDE 12.5 MG: 50 INJECTION INTRAMUSCULAR; INTRAVENOUS at 12:58

## 2022-07-28 RX ADMIN — METRONIDAZOLE 500 MG: 5 INJECTION, SOLUTION INTRAVENOUS at 06:05

## 2022-07-28 RX ADMIN — SCOPALAMINE 1 PATCH: 1 PATCH, EXTENDED RELEASE TRANSDERMAL at 11:33

## 2022-07-28 ASSESSMENT — PAIN DESCRIPTION - PAIN TYPE: TYPE: ACUTE PAIN

## 2022-07-28 NOTE — H&P
DATE OF CONSULTATION:  7/28/2022     REFERRING PHYSICIAN:   Dr Jeff Mcpherson    CONSULTING PHYSICIAN:  Janneth Castellanos M.D.     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  He says that started initially 2 days ago in the periumbilical region and became localized to the right lower quadrant with a tightening sensation and radiation across his lower abdomen.  He denies dysuria or flank pain. Fever of 102.     Patient denies constipation, denies nausea vomiting.  His last colonoscopy was approximately 3 years ago and he reports ports this was normal.    PAST MEDICAL HISTORY:  has a past medical history of Cardiac arrhythmia, Chickenpox, DVT (deep venous thrombosis) (HCC), Frequent urination, Heartburn, Hypertension, Influenza, Palpitations, Paroxysmal atrial fibrillation (HCC) (2/1/2017), Ringing in ears, Tonsillitis, and Wears glasses.    PAST SURGICAL HISTORY:  has a past surgical history that includes repair, knee, acl; tonsillectomy; other; wound exploration ortho (8/13/2011); irrigation & debridement ortho (8/13/2011); and arthroscopy, knee.    ALLERGIES:   Allergies   Allergen Reactions   • Amoxicillin Shortness of Breath     Wheezing     • Ampicillin Runny Nose     Wheezing     • Augmentin Shortness of Breath     Wheezing         CURRENT MEDICATIONS:    Home Medications     Reviewed by Meena Man (Pharmacy Tech) on 07/28/22 at 0738  Med List Status: Complete   Medication Last Dose Status   aspirin EC (ECOTRIN) 81 MG Tablet Delayed Response 7/26/2022 Active   Coenzyme Q10 (COQ-10 PO) 7/26/2022 Active   flecainide (TAMBOCOR) 150 MG Tab 7/27/2022 Active   losartan (COZAAR) 100 MG Tab 7/27/2022 Active   metoprolol (LOPRESSOR) 25 MG Tab 7/27/2022  Active   Multiple Vitamins-Minerals (ICAPS AREDS 2 PO) 7/26/2022 Active   Non Formulary Request 7/26/2022 Active   Nutritional Supplements (PROSTATE PO) 7/26/2022 Active   Pomegranate, Punica granatum, (POMEGRANATE PO) 7/26/2022 Active   rosuvastatin (CRESTOR) 10 MG Tab 7/26/2022 Active   triamcinolone (NASACORT ALLERGY 24HR) 55 MCG/ACT nasal inhaler 7/24/2022 Active   TURMERIC PO 7/26/2022 Active                FAMILY HISTORY: family history includes Hypertension in his father; Stroke in his maternal grandfather.    SOCIAL HISTORY:  reports that he has never smoked. He has never used smokeless tobacco. He reports that he does not drink alcohol and does not use drugs.    REVIEW OF SYSTEMS: All other review of systems, on a twelve point review according to AMA guidelines, were negative except for that stated in the HPI.    PHYSICAL EXAMINATION:    Physical Exam  Constitutional:       Appearance: Normal appearance. He is normal weight.   HENT:      Head: Normocephalic and atraumatic.      Nose: Nose normal.      Mouth/Throat:      Mouth: Mucous membranes are moist.   Eyes:      Pupils: Pupils are equal, round, and reactive to light.   Cardiovascular:      Rate and Rhythm: Normal rate and regular rhythm.   Pulmonary:      Effort: Pulmonary effort is normal.      Breath sounds: Normal breath sounds.   Abdominal:      General: Abdomen is flat.      Palpations: Abdomen is soft.      Tenderness: There is abdominal tenderness. There is guarding.   Musculoskeletal:         General: Normal range of motion.   Skin:     General: Skin is warm and dry.   Neurological:      General: No focal deficit present.      Mental Status: He is alert.   Psychiatric:         Mood and Affect: Mood normal.         LABORATORY VALUES:   Recent Labs     07/28/22  0345   WBC 18.5*   RBC 5.22   HEMOGLOBIN 16.0   HEMATOCRIT 47.4   MCV 90.8   MCH 30.7   MCHC 33.8   RDW 41.2   PLATELETCT 223   MPV 10.1     Recent Labs     07/28/22  0345   SODIUM 135    POTASSIUM 4.3   CHLORIDE 100   CO2 22   GLUCOSE 116*   BUN 17   CREATININE 1.10   CALCIUM 9.2     Recent Labs     07/28/22  0345   ASTSGOT 19   ALTSGPT 14   TBILIRUBIN 1.6*   ALKPHOSPHAT 78   GLOBULIN 3.7*            IMAGING:   CT-ABDOMEN-PELVIS WITH   Final Result         1.  Changes compatible with tip appendicitis   2.  Fluid-filled distended small bowel loops in right lower quadrant, likely ileus related to appendicitis.   3.  Hepatomegaly   4.  Enlarged prostate, workup and evaluation for causes of prostate enlargement recommended as clinically appropriate.      These findings were discussed with the patient's clinician, Dr. Mcpherson, on 7/28/2022 4:54 AM.          ASSESSMENT AND PLAN:   66-year-old male,  With physical exam and CT findings consistent with acute appendicitis, without sign of perforation or abscess.  Patient is n.p.o., he was started on IV Zosyn in the emergency department and has received 2 doses.  Plan to proceed with laparoscopic appendectomy.  Risks of surgery discussed with the patient including bleeding, infection, possible need for open surgery and bowel resection, postoperative abscess, incisional pain and incisional hernias.       ____________________________________     Janneth Castellanos M.D.    DD: 7/28/2022  11:26 AM    ACS NSQIP Surgical Risk Calculator

## 2022-07-28 NOTE — DISCHARGE INSTRUCTIONS
If any questions arise, call your provider Dr. Castellanos [phone].  If your provider is not available, please feel free to call the Surgical Center at [phone].    MEDICATIONS: Resume taking daily medication.  Take prescribed pain medication with food.  If no medication is prescribed, you may take non-aspirin pain medication if needed.  PAIN MEDICATION CAN BE VERY CONSTIPATING.  Take a stool softener or laxative such as senokot, pericolace, or milk of magnesia if needed.    What to Expect Post Anesthesia    Rest and take it easy for the first 24 hours.  A responsible adult is recommended to remain with you during that time.  It is normal to feel sleepy.  We encourage you to not do anything that requires balance, judgment or coordination.    FOR 24 HOURS DO NOT:  Drive, operate machinery or run household appliances.  Drink beer or alcoholic beverages.  Make important decisions or sign legal documents.    To avoid nausea, slowly advance diet as tolerated, avoiding spicy or greasy foods for the first day.  Add more substantial food to your diet according to your provider's instructions. INCREASE FLUIDS AND FIBER TO AVOID CONSTIPATION.    MILD FLU-LIKE SYMPTOMS ARE NORMAL.  YOU MAY EXPERIENCE GENERALIZED MUSCLE ACHES, THROAT IRRITATION, HEADACHE AND/OR SOME NAUSEA.    Remove Scopolamine patch from behind your ear on Sunday, July 31, 2022 - wash hands thoroughly immediately afterward and avoid touching your eyes after touching the medication patch when removing.

## 2022-07-28 NOTE — ED NOTES
Medicated per MAR. Pt's wife is at bedside. Pt is resting comfortably. Declining pain medication at this time. Advised if changes mind to call for nurse. Advised NPO status. Pt verbalizes understanding and agrees to the plan of care.

## 2022-07-28 NOTE — ED TRIAGE NOTES
"Chief Complaint   Patient presents with   • Abdominal Pain     67 y/o male presents with a chief complaint of abdominal pain x2 days. Per patient the pain started as periumbilical pain, and spread across his abdomen, and is now having pain localized to the RLQ. Per patient he has also been having intermittent nausea. Patient stated that he has also had a fever of 102, which he treated with tylenol yesterday. Patient stated that he is having normal bowel movements, and denied any urinary symptoms    • Nausea     /65   Pulse 72   Temp 37.1 °C (98.8 °F) (Temporal)   Resp 16   Ht 1.778 m (5' 10\")   Wt 76.9 kg (169 lb 8.5 oz)   SpO2 97%   BMI 24.33 kg/m²     Patient is vaccinated against COVID 19.   "

## 2022-07-28 NOTE — ED NOTES
Pharmacy Medication Reconciliation      ~Medication reconciliation updated and complete per patient at bedside  ~Allergies have been verified and updated   ~No oral ABX within the last 30 days  ~Patient home pharmacy: [facility]

## 2022-07-28 NOTE — ANESTHESIA TIME REPORT
Anesthesia Start and Stop Event Times     Date Time Event    7/28/2022 1139 Anesthesia Start     1244 Anesthesia Stop        Responsible Staff  07/28/22    Name Role Begin End    Devan De La Cruz M.D. Anesth 1139 1244        Overtime Reason:  no overtime (within assigned shift)    Comments:

## 2022-07-28 NOTE — ANESTHESIA PREPROCEDURE EVALUATION
Case: [id number] Date/Time: 07/28/22 1040    Procedure: APPENDECTOMY, LAPAROSCOPIC    Location: [facility] OR 01 / SURGERY [facility]    Surgeons: Janneth Castellanos M.D.          Relevant Problems   ANESTHESIA   (positive) JACKIE (obstructive sleep apnea)      CARDIAC   (positive) Hypertension   (positive) PAC (premature atrial contraction)   (positive) Paroxysmal atrial fibrillation (HCC)       Physical Exam    Airway   Mallampati: II  TM distance: >3 FB  Neck ROM: full       Cardiovascular - normal exam  Rhythm: regular  Rate: normal  (-) murmur     Dental - normal exam           Pulmonary - normal exam  Breath sounds clear to auscultation     Abdominal    Neurological - normal exam                 Anesthesia Plan    ASA 2- EMERGENT   ASA physical status emergent criteria: acute peritonitis    Plan - general       Airway plan will be ETT          Induction: intravenous    Postoperative Plan: Postoperative administration of opioids is intended.    Pertinent diagnostic labs and testing reviewed    Informed Consent:    Anesthetic plan and risks discussed with patient.    Use of blood products discussed with: patient whom consented to blood products.

## 2022-07-28 NOTE — OR NURSING
APPENDECTOMY, LAPAROSCOPIC  Janneth Castellanos M.D.   Devan De La Cruz M.D., Anesthesiologist  ------------------------------------------------------  1241: To PACU from OR via gurney, sleeping, respirations spontaneous and non-labored via OPA.  Surgical incisions mid-abd, umbilicus and lower mid abd - CDI, edges approximated and attached, EVY.  Pt is resting comfortably, VSS.  No s/sx resp distress. Red rash noted across abd just  left to right. Scopolamine patch behind right ear.     1245: No changes.     1300: Benedryl IV given for rash. VSS.     1315: No changes. No change in surgical site assessment.     1323: OPA DC'd.  Pt is AO, SMART, denies pain or nausea.     1330: Pt drinking apple juice.     1338: No change in surgical site assessment.  Meets criteria to transfer to Stage 2. No complaints of nausea or pain.     1355: Pt transferred to stage 2.

## 2022-07-28 NOTE — ANESTHESIA PROCEDURE NOTES
Airway    Date/Time: 7/28/2022 11:43 AM  Performed by: Devan De La Cruz M.D.  Authorized by: Devan De La Cruz M.D.     Location:  OR  Urgency:  Elective  Indications for Airway Management:  Anesthesia      Spontaneous Ventilation: absent    Sedation Level:  Deep  Preoxygenated: Yes    Patient Position:  Sniffing  Final Airway Type:  Endotracheal airway  Final Endotracheal Airway:  ETT  Cuffed: Yes    Technique Used for Successful ETT Placement:  Direct laryngoscopy    Insertion Site:  Oral  Blade Type:  Call  Laryngoscope Blade/Videolaryngoscope Blade Size:  2  ETT Size (mm):  8.0  Measured from:  Teeth  ETT to Teeth (cm):  24  Placement Verified by: auscultation and capnometry    Cormack-Lehane Classification:  Grade IIb - view of arytenoids or posterior of glottis only  Number of Attempts at Approach:  1  Ventilation Between Attempts:  None  Number of Other Approaches Attempted:  0

## 2022-07-28 NOTE — ED NOTES
" Pt said that around 4am on Tuesday he had abdominal cramping (RLQ), thought it was food poisoning because he had N/V.    Pt also said,  \"I think its Appendicitis\".   "

## 2022-07-28 NOTE — ED NOTES
Assumed care of pt. 
1625-breast feeding. 1645-assessment completed. 1815-asleep in mothers arms. 1845-VSS. Swaddled and being held by mother. 1910-Bedside and Verbal shift change report given to C. [facility] RN  (oncoming nurse) by LISA Pinto LPN (offgoing nurse). Report given with SBAR, Kardex, Intake/Output, MAR and Recent Results. Report to OR, Rachel VALLECILLO. Pt will be picked up 0970.    Report to ER, Chelsie VALLECILLO. Endorsed getting in a gown.

## 2022-07-28 NOTE — OR SURGEON
Operative Report    PreOp Diagnosis: Acute appendicitis        PostOp Diagnosis: Acute appendicitis, suppurative, nonruptured with localized peritonitis        Procedure(s):  APPENDECTOMY, LAPAROSCOPIC - Wound Class: Clean Contaminated     Surgeon(s):  Janneth Castellanos M.D.     Anesthesiologist/Type of Anesthesia:  Anesthesiologist: Devan De La Cruz M.D./General     Surgical Staff:  Circulator: Joi Hopson R.N.  Scrub Person: Napoleon Hansen     Specimens removed if any:  ID Type Source Tests Collected by Time Destination   A : appendix Tissue Appendix PATHOLOGY SPECIMEN Janneth Castellanos M.D. 7/28/2022 0932           Estimated Blood Loss: 5cc     Findings: Inflamed suppurative, mid portion of appendix necrosed   No sign of rupture or contamination.      Complications: none     Procedure In Detail:  Patient was consented taken to the operating room placed in the supine position, anesthesia was induced he was endotracheally intubated without difficulty.  Timeout was performed confirming patient identifiers, planned procedure and administration of IV antibiotics just prior to incision.  2 cm supraumbilical incision was made to place a 12 mm Hunter port under direct visualization.  The abdomen was insufflated to 12 mmHg and a 5 mm 30 degree scope placed within the abdomen.  2 5 mm ports were placed one in the epigastrium 1 in the suprapubic region.  The patient was then placed in 15 degrees Trendelenburg, airplane to the left side down.  The appendix was grasped and retracted towards the liver.  The mesoappendix was divided with the harmonic device exposing the face.  The base of the appendix was transected with an Endo CHUY stapler using a blue load.  The staple line was inspected, the midportion of the staple line appeared to have dehisced.  The staple line was grasped with a retractor elevated and a second firing of the stapler was used to transect below the first staple line. This final  staple line appeared to be  intact with no sign of bleeding.  After hemostasis was confirmed, appendix placed in Endo Catch bag and withdrawn from the abdomen.  The abdomen was then desufflated.  The supraumbilical fascia incision was closed with a figure-of-eight using 0 Vicryl, all skin incisions closed with a subcuticular stitch using 4-0 Monocryl and Dermabond.  All lap sponge and instrument counts were correct at the end the case x2, the patient was awakened from anesthesia extubated taken to the postanesthesia care unit in good condition.            7/28/2022 12:41 PM Janneth Castellanos M.D.

## 2022-08-01 NOTE — ANESTHESIA POSTPROCEDURE EVALUATION
Patient: Mg Kimball II    Procedure Summary     Date: 07/28/22 Room / Location:  OR  / SURGERY [facility]    Anesthesia Start: 1139 Anesthesia Stop: 1244    Procedure: APPENDECTOMY, LAPAROSCOPIC (N/A Abdomen) Diagnosis: (Acute appendicitis, suppurative, nonruptured with localized peritonitis)    Surgeons: Janneth Castellanos M.D. Responsible Provider: Devan De La Cruz M.D.    Anesthesia Type: general ASA Status: 2 - Emergent          Final Anesthesia Type: general  Last vitals  /70       Temp 97.0 F       Pulse 82   Resp 17   SpO2 100%         Anesthesia Post Evaluation    Patient location during evaluation: PACU  Patient participation: complete - patient participated  Level of consciousness: awake and alert    Airway patency: patent  Anesthetic complications: no  Cardiovascular status: hemodynamically stable  Respiratory status: acceptable  Hydration status: euvolemic    PONV: none          No complications documented.     Nurse Pain Score: 0 (NPRS)

## 2022-11-08 ENCOUNTER — PATIENT MESSAGE (OUTPATIENT)
Dept: HEALTH INFORMATION MANAGEMENT | Facility: OTHER | Age: 67
End: 2022-11-08

## 2022-12-02 DIAGNOSIS — I48.0 PAROXYSMAL ATRIAL FIBRILLATION (HCC): ICD-10-CM

## 2022-12-03 NOTE — TELEPHONE ENCOUNTER
Is the patient due for a refill? Yes    Was the patient seen the past year? Yes    Date of last office visit: 12/27/21    Does the patient have an upcoming appointment?  No     Provider to refill: DS    Does the patients insurance require a 100 day supply?  No

## 2022-12-06 RX ORDER — FLECAINIDE ACETATE 150 MG/1
TABLET ORAL
Qty: 90 TABLET | Refills: 0 | Status: SHIPPED | OUTPATIENT
Start: 2022-12-06 | End: 2023-04-27

## 2023-01-05 ENCOUNTER — TELEPHONE (OUTPATIENT)
Dept: CARDIOLOGY | Facility: MEDICAL CENTER | Age: 68
End: 2023-01-05
Payer: MEDICARE

## 2023-01-05 DIAGNOSIS — E78.2 MIXED HYPERLIPIDEMIA: ICD-10-CM

## 2023-01-05 DIAGNOSIS — I48.0 PAROXYSMAL ATRIAL FIBRILLATION (HCC): ICD-10-CM

## 2023-01-05 NOTE — TELEPHONE ENCOUNTER
Routine labs ordered per DS recommendations for upcoming appointment. Latest Medical message sent to patient.

## 2023-01-12 ENCOUNTER — HOSPITAL ENCOUNTER (OUTPATIENT)
Dept: LAB | Facility: MEDICAL CENTER | Age: 68
End: 2023-01-12
Attending: INTERNAL MEDICINE
Payer: MEDICARE

## 2023-01-12 DIAGNOSIS — E78.2 MIXED HYPERLIPIDEMIA: ICD-10-CM

## 2023-01-12 DIAGNOSIS — I48.0 PAROXYSMAL ATRIAL FIBRILLATION (HCC): ICD-10-CM

## 2023-01-12 LAB
ALBUMIN SERPL BCP-MCNC: 4.2 G/DL (ref 3.2–4.9)
ALBUMIN/GLOB SERPL: 1.6 G/DL
ALP SERPL-CCNC: 71 U/L (ref 30–99)
ALT SERPL-CCNC: 21 U/L (ref 2–50)
ANION GAP SERPL CALC-SCNC: 9 MMOL/L (ref 7–16)
AST SERPL-CCNC: 26 U/L (ref 12–45)
BILIRUB SERPL-MCNC: 1 MG/DL (ref 0.1–1.5)
BUN SERPL-MCNC: 24 MG/DL (ref 8–22)
CALCIUM ALBUM COR SERPL-MCNC: 8.9 MG/DL (ref 8.5–10.5)
CALCIUM SERPL-MCNC: 9.1 MG/DL (ref 8.5–10.5)
CHLORIDE SERPL-SCNC: 102 MMOL/L (ref 96–112)
CHOLEST SERPL-MCNC: 136 MG/DL (ref 100–199)
CO2 SERPL-SCNC: 26 MMOL/L (ref 20–33)
CREAT SERPL-MCNC: 1.28 MG/DL (ref 0.5–1.4)
FASTING STATUS PATIENT QL REPORTED: NORMAL
GFR SERPLBLD CREATININE-BSD FMLA CKD-EPI: 60 ML/MIN/1.73 M 2
GLOBULIN SER CALC-MCNC: 2.7 G/DL (ref 1.9–3.5)
GLUCOSE SERPL-MCNC: 90 MG/DL (ref 65–99)
HDLC SERPL-MCNC: 61 MG/DL
LDLC SERPL CALC-MCNC: 64 MG/DL
POTASSIUM SERPL-SCNC: 4.8 MMOL/L (ref 3.6–5.5)
PROT SERPL-MCNC: 6.9 G/DL (ref 6–8.2)
SODIUM SERPL-SCNC: 137 MMOL/L (ref 135–145)
TRIGL SERPL-MCNC: 53 MG/DL (ref 0–149)

## 2023-01-12 PROCEDURE — 80061 LIPID PANEL: CPT

## 2023-01-12 PROCEDURE — 80053 COMPREHEN METABOLIC PANEL: CPT

## 2023-01-12 PROCEDURE — 36415 COLL VENOUS BLD VENIPUNCTURE: CPT

## 2023-01-18 ENCOUNTER — OFFICE VISIT (OUTPATIENT)
Dept: CARDIOLOGY | Facility: MEDICAL CENTER | Age: 68
End: 2023-01-18
Payer: MEDICARE

## 2023-01-18 VITALS
HEIGHT: 71 IN | DIASTOLIC BLOOD PRESSURE: 68 MMHG | OXYGEN SATURATION: 97 % | SYSTOLIC BLOOD PRESSURE: 132 MMHG | WEIGHT: 167.5 LBS | BODY MASS INDEX: 23.45 KG/M2 | RESPIRATION RATE: 18 BRPM | HEART RATE: 54 BPM

## 2023-01-18 DIAGNOSIS — E78.2 MIXED HYPERLIPIDEMIA: ICD-10-CM

## 2023-01-18 DIAGNOSIS — G47.33 OSA (OBSTRUCTIVE SLEEP APNEA): ICD-10-CM

## 2023-01-18 DIAGNOSIS — I48.0 PAROXYSMAL ATRIAL FIBRILLATION (HCC): ICD-10-CM

## 2023-01-18 DIAGNOSIS — I10 PRIMARY HYPERTENSION: ICD-10-CM

## 2023-01-18 PROBLEM — K35.80 ACUTE APPENDICITIS: Status: RESOLVED | Noted: 2022-07-28 | Resolved: 2023-01-18

## 2023-01-18 LAB — EKG IMPRESSION: NORMAL

## 2023-01-18 PROCEDURE — 99214 OFFICE O/P EST MOD 30 MIN: CPT | Mod: 25 | Performed by: INTERNAL MEDICINE

## 2023-01-18 PROCEDURE — 93000 ELECTROCARDIOGRAM COMPLETE: CPT | Performed by: INTERNAL MEDICINE

## 2023-01-18 RX ORDER — METOPROLOL SUCCINATE 25 MG/1
TABLET, EXTENDED RELEASE ORAL
COMMUNITY
Start: 2022-12-02 | End: 2023-01-18

## 2023-01-18 RX ORDER — METOPROLOL SUCCINATE 25 MG/1
TABLET, EXTENDED RELEASE ORAL
COMMUNITY
End: 2023-01-18

## 2023-01-18 RX ORDER — FLECAINIDE ACETATE 50 MG/1
TABLET ORAL
COMMUNITY
End: 2023-03-26

## 2023-01-18 RX ORDER — VANCOMYCIN HYDROCHLORIDE 125 MG/1
CAPSULE ORAL
COMMUNITY
End: 2023-01-18

## 2023-01-18 ASSESSMENT — FIBROSIS 4 INDEX: FIB4 SCORE: 1.76

## 2023-01-18 NOTE — PROGRESS NOTES
Chief Complaint   Patient presents with    Atrial Fibrillation     F/V Dx: Paroxysmal atrial fibrillation (HCC)       Subjective     Ed Inder Kimball II is a 67 y.o. male who presents today with atrial fibrillation on flecainide and Toprol.  CHADS-VASC of  2.  On aspirin per patient's decision.  Overall feels well.  Coronary calcium scan score of 0.  On statins.  Some skipped heartbeats but no runs.  Recent appendicitis complicated by C. Difficile.    Past Medical History:   Diagnosis Date    Cardiac arrhythmia     Chickenpox     DVT (deep venous thrombosis) (HCC)     Frequent urination     Heartburn     Hypertension     Influenza     Palpitations     Paroxysmal atrial fibrillation (HCC) 2/1/2017    Ringing in ears     Tonsillitis     Wears glasses      Past Surgical History:   Procedure Laterality Date    AR LAP,APPENDECTOMY N/A 7/28/2022    Procedure: APPENDECTOMY, LAPAROSCOPIC;  Surgeon: Janneth Castellanos M.D.;  Location: SURGERY [facility];  Service: Gen Robotic    WOUND EXPLORATION ORTHO  8/13/2011    Performed by JORGE ENGLAND at [facility] OR[facility]    IRRIGATION & DEBRIDEMENT ORTHO  8/13/2011    Performed by JORGE ENGLAND at [facility] OR[facility]    ARTHROSCOPY, KNEE      OTHER      MCL and meniscus repair    REPAIR, KNEE, ACL      bilateral    TONSILLECTOMY       Family History   Problem Relation Age of Onset    Hypertension Father     Stroke Maternal Grandfather      Social History     Socioeconomic History    Marital status:      Spouse name: Not on file    Number of children: Not on file    Years of education: Not on file    Highest education level: Professional school degree (e.g., MD, DDS, DVM, ZAFAR)   Occupational History    Not on file   Tobacco Use    Smoking status: Never    Smokeless tobacco: Never   Vaping Use    Vaping Use: Never used   Substance and Sexual Activity    Alcohol use: No    Drug use: No    Sexual activity: Not on file   Other Topics Concern    Not on file    Social History Narrative    Not on file     Social Determinants of Health     Financial Resource Strain: Not on file   Food Insecurity: Not on file   Transportation Needs: Not on file   Physical Activity: Not on file   Stress: Not on file   Social Connections: Not on file   Intimate Partner Violence: Not on file   Housing Stability: Not on file     Allergies   Allergen Reactions    Amoxicillin Shortness of Breath     Wheezing      Ampicillin Runny Nose     Wheezing      Augmentin Shortness of Breath     Wheezing       Outpatient Encounter Medications as of 1/18/2023   Medication Sig Dispense Refill    flecainide (TAMBOCOR) 50 MG tablet flecainide 50 mg tablet      flecainide (TAMBOCOR) 150 MG Tab TAKE 1/2 TABLET TWICE DAILY 90 Tablet 0    aspirin EC (ECOTRIN) 81 MG Tablet Delayed Response Take 81 mg by mouth every morning.      Coenzyme Q10 (COQ-10 PO) Take 1 Tablet by mouth every 48 hours.      TURMERIC PO Take 1 Tablet by mouth every morning.      Multiple Vitamins-Minerals (ICAPS AREDS 2 PO) Take 1 Tablet by mouth every morning.      Pomegranate, Punica granatum, (POMEGRANATE PO) Take 1 Tablet by mouth 2 times a day.      Non Formulary Request Take 2 Tablets by mouth every morning. BEETS CHEWABLE VITAMIN      Nutritional Supplements (PROSTATE PO) Take 1 Tablet by mouth every morning.      losartan (COZAAR) 100 MG Tab Take 100 mg by mouth every day.      rosuvastatin (CRESTOR) 10 MG Tab Take 10 mg by mouth 3 times a week. Sunday, Tuesday, & Thursday      metoprolol (LOPRESSOR) 25 MG Tab Take 6.25 mg by mouth 2 times a day. 1 Tab 1    [DISCONTINUED] vancomycin (VANCOCIN) 125 MG capsule vancomycin 125 mg capsule   TAKE 1 CAPSULE BY MOUTH EVERY 6 HOURS FOR 10 DAYS (Patient not taking: Reported on 1/18/2023)      [DISCONTINUED] metoprolol SR (TOPROL XL) 25 MG TABLET SR 24 HR  (Patient not taking: Reported on 1/18/2023)      [DISCONTINUED] metoprolol SR (TOPROL XL) 25 MG TABLET SR 24 HR metoprolol succinate ER 25 mg  "tablet,extended release 24 hr (Patient not taking: Reported on 1/18/2023)      [DISCONTINUED] triamcinolone (NASACORT ALLERGY 24HR) 55 MCG/ACT nasal inhaler Administer 1-2 Sprays into affected nostril(S) 1 time a day as needed (Allergy).       No facility-administered encounter medications on file as of 1/18/2023.     ROS           Objective     /68 (BP Location: Left arm, Patient Position: Sitting, BP Cuff Size: Adult)   Pulse (!) 54   Resp 18   Ht 1.791 m (5' 10.5\")   Wt 76 kg (167 lb 8 oz)   SpO2 97%   BMI 23.69 kg/m²     Physical Exam  Constitutional:       Appearance: He is well-developed.   HENT:      Head: Normocephalic and atraumatic.   Cardiovascular:      Rate and Rhythm: Normal rate and regular rhythm.      Heart sounds: No murmur heard.    No friction rub. No gallop.   Pulmonary:      Effort: Pulmonary effort is normal.      Breath sounds: Normal breath sounds.   Abdominal:      Palpations: Abdomen is soft.   Musculoskeletal:         General: Normal range of motion.      Cervical back: Normal range of motion and neck supple.   Skin:     General: Skin is warm and dry.   Neurological:      Mental Status: He is alert and oriented to person, place, and time.   Psychiatric:         Behavior: Behavior normal.         Thought Content: Thought content normal.         Judgment: Judgment normal.              Assessment & Plan     1. Paroxysmal atrial fibrillation (HCC)  EKG      2. JACKIE (obstructive sleep apnea)        3. Primary hypertension        4. Mixed hyperlipidemia            Medical Decision Making: Today's Assessment/Status/Plan:   1.  Paroxysmal atrial fibrillation continue current regimen.  2.  Hyperlipidemia continue statins.  3.  Continue aspirin.  4.  Hypertension on losartan.  5.  Follow-up in 6 to 12 months                     "

## 2023-02-06 ENCOUNTER — PATIENT MESSAGE (OUTPATIENT)
Dept: CARDIOLOGY | Facility: MEDICAL CENTER | Age: 68
End: 2023-02-06
Payer: MEDICARE

## 2023-02-06 DIAGNOSIS — R07.89 CHEST DISCOMFORT: ICD-10-CM

## 2023-02-13 NOTE — TELEPHONE ENCOUNTER
To: EA    Patient is complaining of chest discomfort with activity, but has also suffered injuries to his sternum in previous years due to Karate. Patient does state that he feels chest discomfort when his heart rate increases above 100bpm though. ER precautions advise. DS on vacation. Please advise. Thank you

## 2023-02-14 ENCOUNTER — TELEPHONE (OUTPATIENT)
Dept: CARDIOLOGY | Facility: MEDICAL CENTER | Age: 68
End: 2023-02-14
Payer: MEDICARE

## 2023-02-14 NOTE — PATIENT COMMUNICATION
Left message for pt and also rescheduled pt for 3/26/19 at 10:40 am per Dr. Weiss being out of the office. Letter mailed out. This was the second call to pt.   
Lvm to schedule stress test, gave [phone] number and mailing out the order and prep sheet.  
RN received the following recommendations from MARIPOSA Tyler.      I think it would be reasonable to proceed with a repeat stress test given symptoms, last stress was in 2015.  Please make sure to discuss ER precautions in the meantime.      Thank you  Treva Norwood message  sent to patient and stress test ordered per FANTA recommendations.   
Ruben Fraser,    I think it would be reasonable to proceed with a repeat stress test given symptoms, last stress was in 2015.  Please make sure to discuss ER precautions in the meantime.     Thank you  Treva   
Do not drive or operate machinery/Do not make important decisions/No heavy lifting/straining

## 2023-02-24 ENCOUNTER — HOSPITAL ENCOUNTER (OUTPATIENT)
Dept: RADIOLOGY | Facility: MEDICAL CENTER | Age: 68
End: 2023-02-24
Attending: INTERNAL MEDICINE
Payer: MEDICARE

## 2023-02-24 DIAGNOSIS — R07.89 CHEST DISCOMFORT: ICD-10-CM

## 2023-02-24 PROCEDURE — 78452 HT MUSCLE IMAGE SPECT MULT: CPT

## 2023-02-24 PROCEDURE — 78452 HT MUSCLE IMAGE SPECT MULT: CPT | Mod: 26 | Performed by: INTERNAL MEDICINE

## 2023-02-24 PROCEDURE — 93018 CV STRESS TEST I&R ONLY: CPT | Performed by: INTERNAL MEDICINE

## 2023-03-26 ENCOUNTER — OFFICE VISIT (OUTPATIENT)
Dept: URGENT CARE | Facility: CLINIC | Age: 68
End: 2023-03-26
Payer: MEDICARE

## 2023-03-26 VITALS
DIASTOLIC BLOOD PRESSURE: 70 MMHG | RESPIRATION RATE: 16 BRPM | SYSTOLIC BLOOD PRESSURE: 128 MMHG | OXYGEN SATURATION: 98 % | HEIGHT: 71 IN | BODY MASS INDEX: 23.52 KG/M2 | WEIGHT: 168 LBS | HEART RATE: 66 BPM | TEMPERATURE: 99 F

## 2023-03-26 DIAGNOSIS — J02.9 SORE THROAT: ICD-10-CM

## 2023-03-26 LAB — S PYO DNA SPEC NAA+PROBE: NOT DETECTED

## 2023-03-26 PROCEDURE — 99203 OFFICE O/P NEW LOW 30 MIN: CPT | Performed by: FAMILY MEDICINE

## 2023-03-26 PROCEDURE — 87651 STREP A DNA AMP PROBE: CPT | Performed by: FAMILY MEDICINE

## 2023-03-26 ASSESSMENT — FIBROSIS 4 INDEX: FIB4 SCORE: 1.76

## 2023-03-26 NOTE — PROGRESS NOTES
"  Subjective:      67 y.o. male presents to urgent care for cold symptoms that started 4 days ago.  He is experiencing congestion, cough, and sore throat.  No tobacco product use.  No history of asthma or COPD.  He is fully vaccinated against COVID.  No known sick contacts.    He denies any other questions or concerns at this time.    Current problem list, medication, and past medical/surgical history were reviewed in Epic.    ROS  See HPI     Objective:      /70 (BP Location: Left arm, Patient Position: Sitting, BP Cuff Size: Adult)   Pulse 66   Temp 37.2 °C (99 °F) (Temporal)   Resp 16   Ht 1.791 m (5' 10.5\")   Wt 76.2 kg (168 lb)   SpO2 98%   BMI 23.76 kg/m²     Physical Exam  Constitutional:       General: He is not in acute distress.     Appearance: He is not diaphoretic.   HENT:      Right Ear: Tympanic membrane, ear canal and external ear normal.      Left Ear: Tympanic membrane, ear canal and external ear normal.      Mouth/Throat:      Tongue: Tongue does not deviate from midline.      Palate: No lesions.      Pharynx: Uvula midline. Posterior oropharyngeal erythema present.      Tonsils: No tonsillar exudate. 0 on the right. 0 on the left.   Cardiovascular:      Rate and Rhythm: Normal rate and regular rhythm.      Heart sounds: Normal heart sounds.   Pulmonary:      Effort: Pulmonary effort is normal. No respiratory distress.      Breath sounds: Normal breath sounds.   Neurological:      Mental Status: He is alert.   Psychiatric:         Mood and Affect: Affect normal.         Judgment: Judgment normal.     Assessment/Plan:     1. Sore throat  Rapid strep negative.  Most consistent with viral etiology.  Tylenol, ibuprofen, gargle with warm salt water as needed for symptomatic relief.  - POCT GROUP A STREP, PCR      Instructed to return to Urgent Care or nearest Emergency Department if symptoms fail to improve, for any change in condition, further concerns, or new concerning symptoms. Patient " states understanding of the plan of care and discharge instructions.    Evelyn Barker M.D.

## 2023-08-17 ENCOUNTER — TELEPHONE (OUTPATIENT)
Dept: CARDIOLOGY | Facility: MEDICAL CENTER | Age: 68
End: 2023-08-17
Payer: MEDICARE

## 2023-08-17 DIAGNOSIS — I48.0 PAROXYSMAL ATRIAL FIBRILLATION (HCC): ICD-10-CM

## 2023-08-17 NOTE — TELEPHONE ENCOUNTER
"Phone Number Called: [phone]    Call outcome: Spoke to patient regarding message below.    Message: Called to follow up with patient regarding being in a fib. Patient sent Dr Orozco some EKGs showing he was in possible afib. Patient Ronit readings include A Fib and SR with PACs. Patient is currently taking flecainide 75 MG BID and Metoprolol 6.25 MG BID. Patient takes an aspirin but is not on a blood thinner. He does notice at night the past week that he has had \"micr runs\" of a fib at [facility] when he gets up to urinate. Patient states only symptom is palpitations. HR remaining controlled in the 60's. Patient willing to come in to see DS as needed. Patient did take an extra dose of Flecainide as recommended by DS. Ronti while on the phone showing sinus with ventricular ectopy HR 56. Looks better to patient. All P waves present based on his review.     To DS: pt wants to know if you think he should start on blood thinner. Sent you EKGs.    "

## 2023-08-17 NOTE — TELEPHONE ENCOUNTER
DS    Caller: Dr.Edward Inder Kimball II    Topic/issue: MEDICAL ADVICE    Dr. Kimball states that he is in AFIB and he needs direction on what to do moving forward. Please advise.    Thank you,  Peng DENNEY    Callback Number: [phone] (home) [phone] (work)

## 2023-12-20 ENCOUNTER — APPOINTMENT (OUTPATIENT)
Dept: RADIOLOGY | Facility: MEDICAL CENTER | Age: 68
End: 2023-12-20
Attending: EMERGENCY MEDICINE
Payer: MEDICARE

## 2023-12-20 ENCOUNTER — HOSPITAL ENCOUNTER (OUTPATIENT)
Dept: RADIOLOGY | Facility: MEDICAL CENTER | Age: 68
End: 2023-12-20
Attending: PHYSICIAN ASSISTANT
Payer: MEDICARE

## 2023-12-20 ENCOUNTER — HOSPITAL ENCOUNTER (EMERGENCY)
Facility: MEDICAL CENTER | Age: 68
End: 2023-12-20
Attending: EMERGENCY MEDICINE
Payer: MEDICARE

## 2023-12-20 VITALS
SYSTOLIC BLOOD PRESSURE: 129 MMHG | BODY MASS INDEX: 22.96 KG/M2 | DIASTOLIC BLOOD PRESSURE: 63 MMHG | RESPIRATION RATE: 20 BRPM | HEART RATE: 56 BPM | WEIGHT: 160.4 LBS | OXYGEN SATURATION: 96 % | HEIGHT: 70 IN | TEMPERATURE: 98.3 F

## 2023-12-20 DIAGNOSIS — I26.94 MULTIPLE SUBSEGMENTAL PULMONARY EMBOLI WITHOUT ACUTE COR PULMONALE (HCC): ICD-10-CM

## 2023-12-20 DIAGNOSIS — M79.662 PAIN OF LEFT LOWER LEG: ICD-10-CM

## 2023-12-20 LAB
ANION GAP SERPL CALC-SCNC: 12 MMOL/L (ref 7–16)
APTT PPP: 29.5 SEC (ref 24.7–36)
BASOPHILS # BLD AUTO: 0.4 % (ref 0–1.8)
BASOPHILS # BLD: 0.04 K/UL (ref 0–0.12)
BUN SERPL-MCNC: 18 MG/DL (ref 8–22)
CALCIUM SERPL-MCNC: 9.2 MG/DL (ref 8.4–10.2)
CHLORIDE SERPL-SCNC: 100 MMOL/L (ref 96–112)
CO2 SERPL-SCNC: 25 MMOL/L (ref 20–33)
CREAT SERPL-MCNC: 1.24 MG/DL (ref 0.5–1.4)
EKG IMPRESSION: NORMAL
EOSINOPHIL # BLD AUTO: 0.09 K/UL (ref 0–0.51)
EOSINOPHIL NFR BLD: 0.9 % (ref 0–6.9)
ERYTHROCYTE [DISTWIDTH] IN BLOOD BY AUTOMATED COUNT: 39.1 FL (ref 35.9–50)
GFR SERPLBLD CREATININE-BSD FMLA CKD-EPI: 62 ML/MIN/1.73 M 2
GLUCOSE SERPL-MCNC: 90 MG/DL (ref 65–99)
HCT VFR BLD AUTO: 46 % (ref 42–52)
HGB BLD-MCNC: 15.4 G/DL (ref 14–18)
IMM GRANULOCYTES # BLD AUTO: 0.08 K/UL (ref 0–0.11)
IMM GRANULOCYTES NFR BLD AUTO: 0.8 % (ref 0–0.9)
INR PPP: 1.12 (ref 0.87–1.13)
LYMPHOCYTES # BLD AUTO: 2.43 K/UL (ref 1–4.8)
LYMPHOCYTES NFR BLD: 23.5 % (ref 22–41)
MCH RBC QN AUTO: 30.2 PG (ref 27–33)
MCHC RBC AUTO-ENTMCNC: 33.5 G/DL (ref 32.3–36.5)
MCV RBC AUTO: 90.2 FL (ref 81.4–97.8)
MONOCYTES # BLD AUTO: 0.73 K/UL (ref 0–0.85)
MONOCYTES NFR BLD AUTO: 7.1 % (ref 0–13.4)
NEUTROPHILS # BLD AUTO: 6.97 K/UL (ref 1.82–7.42)
NEUTROPHILS NFR BLD: 67.3 % (ref 44–72)
NRBC # BLD AUTO: 0 K/UL
NRBC BLD-RTO: 0 /100 WBC (ref 0–0.2)
PLATELET # BLD AUTO: 267 K/UL (ref 164–446)
PMV BLD AUTO: 9.6 FL (ref 9–12.9)
POTASSIUM SERPL-SCNC: 4.4 MMOL/L (ref 3.6–5.5)
PROTHROMBIN TIME: 14.9 SEC (ref 12–14.6)
RBC # BLD AUTO: 5.1 M/UL (ref 4.7–6.1)
SODIUM SERPL-SCNC: 137 MMOL/L (ref 135–145)
WBC # BLD AUTO: 10.3 K/UL (ref 4.8–10.8)

## 2023-12-20 PROCEDURE — 85610 PROTHROMBIN TIME: CPT

## 2023-12-20 PROCEDURE — 93005 ELECTROCARDIOGRAM TRACING: CPT

## 2023-12-20 PROCEDURE — 80048 BASIC METABOLIC PNL TOTAL CA: CPT

## 2023-12-20 PROCEDURE — 700117 HCHG RX CONTRAST REV CODE 255: Performed by: EMERGENCY MEDICINE

## 2023-12-20 PROCEDURE — 93005 ELECTROCARDIOGRAM TRACING: CPT | Performed by: EMERGENCY MEDICINE

## 2023-12-20 PROCEDURE — 36415 COLL VENOUS BLD VENIPUNCTURE: CPT

## 2023-12-20 PROCEDURE — 71275 CT ANGIOGRAPHY CHEST: CPT

## 2023-12-20 PROCEDURE — 99285 EMERGENCY DEPT VISIT HI MDM: CPT

## 2023-12-20 PROCEDURE — 85730 THROMBOPLASTIN TIME PARTIAL: CPT

## 2023-12-20 PROCEDURE — 85025 COMPLETE CBC W/AUTO DIFF WBC: CPT

## 2023-12-20 PROCEDURE — 93971 EXTREMITY STUDY: CPT

## 2023-12-20 RX ADMIN — IOHEXOL 70 ML: 350 INJECTION, SOLUTION INTRAVENOUS at 22:06

## 2023-12-20 ASSESSMENT — FIBROSIS 4 INDEX: FIB4 SCORE: 1.78

## 2023-12-21 NOTE — ED PROVIDER NOTES
ED Provider Note    CHIEF COMPLAINT  Chief Complaint   Patient presents with    Leg Pain     L leg +DVT per pt.     Other     Reports recent COVID dx and also endorses cough. Pt. Asking for CT scan to r/o PE.        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  Patient was just diagnosed with a DVT prior to arrival today.  he is apparently been having left lower extremity pain around the calf and lateral portion of the knee after being hit by a large dog.  Patient notes he does not have redness or significant swelling and no increase in measurement of the leg there was an acute DVT seen on ultrasound today after he was seen at urgent care.  Patient came straight here as he was concerned for PE as well.    EXTERNAL RECORDS REVIEWED  Reviewed vascular study from earlier today as well as the office visit to the [facility]  ROS  Constitutional: No fevers or chills  Skin: No rashes  HEENT: Mild sore throat, runny nose, nasal congestion  Neck: No neck pain  Chest: No pain or rashes  Pulm: Mild cough.  No wheezing or stridor.  Gastrointestinal: No nausea, vomiting, diarrhea, constipation, bloating, melena, hematochezia or abdominal pain.  Genitourinary: No dysuria or hematuria  Musculoskeletal: Pain to left lateral tib-fib region.  Neurologic: No sensory or focal motor changes to extremities. No confusion or disorientation.  Heme: No bleeding or bruising problems.   Immuno: No hx of recurrent infections        LIMITATION TO HISTORY   None  OUTSIDE HISTORIAN(S):  None        PAST FAM HISTORY  Family History   Problem Relation Age of Onset    Hypertension Father     Stroke Maternal Grandfather        PAST MEDICAL HISTORY   has a past medical history of Cardiac arrhythmia, Chickenpox, DVT (deep venous thrombosis) (HCC), Frequent urination, Heartburn, Hypertension, Influenza, Palpitations, Paroxysmal atrial  "fibrillation (HCC) (2/1/2017), Ringing in ears, Tonsillitis, and Wears glasses.    SOCIAL HISTORY  Social History     Tobacco Use    Smoking status: Never    Smokeless tobacco: Never   Vaping Use    Vaping Use: Never used   Substance and Sexual Activity    Alcohol use: No    Drug use: No    Sexual activity: Not on file       SURGICAL HISTORY   has a past surgical history that includes repair, knee, acl; tonsillectomy; other; wound exploration ortho (8/13/2011); irrigation & debridement ortho (8/13/2011); arthroscopy, knee; and lap,appendectomy (N/A, 7/28/2022).    CURRENT MEDICATIONS  Home Medications       Reviewed by Disha Real R.N. (Registered Nurse) on 12/20/23 at 1753  Med List Status: Not Addressed     Medication Last Dose Status   apixaban (ELIQUIS) 5mg Tab  Active   Coenzyme Q10 (COQ-10 PO)  Active   flecainide (TAMBOCOR) 150 MG Tab  Active   losartan (COZAAR) 100 MG Tab  Active   metoprolol (LOPRESSOR) 25 MG Tab  Active   Multiple Vitamins-Minerals (ICAPS AREDS 2 PO)  Active   Non Formulary Request  Active   Nutritional Supplements (PROSTATE PO)  Active   Pomegranate, [place] granatum, (POMEGRANATE PO)  Active   rosuvastatin (CRESTOR) 10 MG Tab  Active   TURMERIC PO  Active                     ALLERGIES  Allergies   Allergen Reactions    Amoxicillin Shortness of Breath     Wheezing      Ampicillin Runny Nose     Wheezing      Augmentin Shortness of Breath     Wheezing         PHYSICAL EXAM  VITAL SIGNS: /63   Pulse (!) 56   Temp 36.8 °C (98.3 °F) (Temporal)   Resp 20   Ht 1.778 m (5' 10\")   Wt 72.8 kg (160 lb 6.4 oz)   SpO2 96%   BMI 23.02 kg/m²    Gen: Alert in no apparent distress.  HEENT: No signs of trauma, Bilateral external ears normal, Nose normal. Conjunctiva normal, Non-icteric.   Cardiovascular: Regular rate and rhythm, no murmurs.  Capillary refill less than 3 seconds to all extremities, 2+ distal pulses.  Thorax & Lungs: Normal breath sounds, No respiratory distress, No " wheezing bilateral chest rise  Abdomen: Bowel sounds normal, Soft, No tenderness, No masses, No pulsatile masses. No Guarding or rebound  Skin: Warm, Dry, No erythema, No rash noted to exposed areas.   Extremities: Intact distal pulses, No edema.  No erythema, negative Homans' sign.  No popliteal fossa tenderness.  There is mild tenderness to the lateral portion of the lateral fibular region proximally.  Neurologic: Alert , no facial droop, grossly normal coordination and strength  Psychiatric: Affect pleasant    INITIAL IMPRESSION  Patient arrives for evaluation of cough and has a very recent diagnosis of DVT, and has none of the typical symptoms or findings aside from discomfort.  This makes his cough quite concerning for possible atypical presentation of PE.  Fortunately, he is not short of breath and does not have chest pain.  He is not requiring supplemental oxygen and is not tachycardic although he is noted to be on a beta-blocker.  Regardless, the patient will be anticoagulated no matter what.  He is noted to have the Eliquis at home already.  He has not started to take it yet however.  CT imaging will be ordered to evaluate for PE and laboratory evaluation will be undertaken.    ED observation? No  LABS  Results for orders placed or performed during the hospital encounter of 12/20/23   CBC WITH DIFFERENTIAL   Result Value Ref Range    WBC 10.3 4.8 - 10.8 K/uL    RBC 5.10 4.70 - 6.10 M/uL    Hemoglobin 15.4 14.0 - 18.0 g/dL    Hematocrit 46.0 42.0 - 52.0 %    MCV 90.2 81.4 - 97.8 fL    MCH 30.2 27.0 - 33.0 pg    MCHC 33.5 32.3 - 36.5 g/dL    RDW 39.1 35.9 - 50.0 fL    Platelet Count 267 164 - 446 K/uL    MPV 9.6 9.0 - 12.9 fL    Neutrophils-Polys 67.30 44.00 - 72.00 %    Lymphocytes 23.50 22.00 - 41.00 %    Monocytes 7.10 0.00 - 13.40 %    Eosinophils 0.90 0.00 - 6.90 %    Basophils 0.40 0.00 - 1.80 %    Immature Granulocytes 0.80 0.00 - 0.90 %    Nucleated RBC 0.00 0.00 - 0.20 /100 WBC    Neutrophils  (Absolute) 6.97 1.82 - 7.42 K/uL    Lymphs (Absolute) 2.43 1.00 - 4.80 K/uL    Monos (Absolute) 0.73 0.00 - 0.85 K/uL    Eos (Absolute) 0.09 0.00 - 0.51 K/uL    Baso (Absolute) 0.04 0.00 - 0.12 K/uL    Immature Granulocytes (abs) 0.08 0.00 - 0.11 K/uL    NRBC (Absolute) 0.00 K/uL   BASIC METABOLIC PANEL   Result Value Ref Range    Sodium 137 135 - 145 mmol/L    Potassium 4.4 3.6 - 5.5 mmol/L    Chloride 100 96 - 112 mmol/L    Co2 25 20 - 33 mmol/L    Glucose 90 65 - 99 mg/dL    Bun 18 8 - 22 mg/dL    Creatinine 1.24 0.50 - 1.40 mg/dL    Calcium 9.2 8.4 - 10.2 mg/dL    Anion Gap 12.0 7.0 - 16.0   PROTHROMBIN TIME   Result Value Ref Range    PT 14.9 (H) 12.0 - 14.6 sec    INR 1.12 0.87 - 1.13   APTT   Result Value Ref Range    APTT 29.5 24.7 - 36.0 sec   ESTIMATED GFR   Result Value Ref Range    GFR (CKD-EPI) 62 >60 mL/min/1.73 m 2   EKG   Result Value Ref Range    Report       [facility] Emergency Dept.    Test Date:  2023  Pt Name:    JENNA NAVAS                Department: [facility]  MRN:        [id number]                      Room:  Gender:     Male                         Technician: [id number]  :        1955                   Requested By:ER TRIAGE PROTOCOL  Order #:    [id number]                    Reading MD: STEPHANIE ROBERTS MD    Measurements  Intervals                                Axis  Rate:       53                           P:          39  CA:         208                          QRS:        -26  QRSD:       102                          T:          46  QT:         450  QTc:        423    Interpretive Statements  Sinus rhythm  Borderline left axis deviation  Compared to ECG 2023 08:29:14  Sinus bradycardia no longer present  Electronically Signed On 2023 20:15:29 PST by STEPHANIE ROBERTS MD       I have independently interpreted this EKG  Sinus bradycardia with a rate of 53.  CA interval is slightly prolonged.  The other intervals appear to be within normal  limits.  There is a borderline left axis deviation.  There is no significant change when compared to EKG performed in January of this year.    RADIOLOGY  CT-CTA CHEST PULMONARY ARTERY W/ RECONS   Final Result   Addendum (preliminary) 1 of 1   These findings were subsequently discussed with the patient's clinician,    Iker Garcia, on 12/20/2023 10:33 PM.      Final         1.  Bilateral lower and right middle lobe subsegmental pulmonary emboli without visualized changes of right heart strain.   2.  4.1 cm ascending thoracic aortic aneurysm, radiographic follow-up and surveillance recommended as clinically appropriate.   3.  Bilateral renal cysts      These findings were discussed with the patient's clinician, Poncho Schreiber, on 12/20/2023 10:16 PM.                COURSE & MEDICAL DECISION MAKING  Pertinent Labs & Imaging studies reviewed. (See chart for details)  Discussed CT with the radiologist who noted multiple small PEs.  The patient states understanding of this and of the treatment with Eliquis.  At this point I do not feel inpatient admission is necessary as he does not have any signs of right heart strain, either clinically or radiographically, and is not hypoxic.  He does not have chest pain and does not have tachycardia, albeit on a beta-blocker.  I did give the patient the option for admission for initiation of anticoagulation and echocardiography however I felt that could be safely be pursued as an outpatient provided the symptoms do not worsen or change in any way.  He was comfortable with the plan for discharge.  He was incidentally noted to have a 4.1 cm ascending thoracic aortic aneurysm however, again with no chest pain, this is an incidental finding can be discussed with his primary care physician for future surveillance.      I have discussed management of the patient with the following physicians and AIDE's: None    Escalation of care considered, and ultimately not performed:acute inpatient care  management, however at this time, the patient is most appropriate for outpatient management    Barriers to care at this time, including but not limited to: None.     Decision tools and Rx drugs considered including, but not limited to : Eliquis     Discussion of management with other QHP or appropriate source(s): None    The patient will not drink alcohol nor drive with prescribed medications. The patient will return for worsening symptoms and is stable at the time of discharge. The patient verbalizes understanding and will comply.    FINAL IMPRESSION  1. Multiple subsegmental pulmonary emboli without acute cor pulmonale (HCC)        Electronically signed by: Iker Garcia M.D., 12/20/2023 8:51 PM

## 2023-12-21 NOTE — ED NOTES
LAC IV never removed since this was never placed during this ER visit. RAC was placed and removed only.

## 2023-12-21 NOTE — RESULT ENCOUNTER NOTE
Attempted to reach patient.  Patient followed instructions and went to the ER.  I had told him at time of appointment if he is positive go straight to ER

## 2023-12-21 NOTE — ED TRIAGE NOTES
Chief Complaint   Patient presents with    Leg Pain     L leg +DVT per pt.     Other     Reports recent COVID dx and also endorses cough. Pt. Asking for CT scan to r/o PE.      Reports he has RX for eliquis but has not been taking this.

## 2024-01-30 ENCOUNTER — APPOINTMENT (OUTPATIENT)
Dept: CARDIOLOGY | Facility: MEDICAL CENTER | Age: 69
End: 2024-01-30
Attending: INTERNAL MEDICINE
Payer: MEDICARE

## 2024-02-15 DIAGNOSIS — I48.0 PAROXYSMAL ATRIAL FIBRILLATION (HCC): ICD-10-CM

## 2024-02-15 RX ORDER — FLECAINIDE ACETATE 150 MG/1
TABLET ORAL
Qty: 90 TABLET | Refills: 0 | Status: SHIPPED | OUTPATIENT
Start: 2024-02-15

## 2024-04-28 DIAGNOSIS — I48.0 PAROXYSMAL ATRIAL FIBRILLATION (HCC): ICD-10-CM

## 2024-04-29 RX ORDER — FLECAINIDE ACETATE 150 MG/1
75 TABLET ORAL 2 TIMES DAILY
Qty: 90 TABLET | Refills: 0 | OUTPATIENT
Start: 2024-04-29

## 2024-07-19 ENCOUNTER — OFFICE VISIT (OUTPATIENT)
Dept: URGENT CARE | Facility: CLINIC | Age: 69
End: 2024-07-19
Payer: MEDICARE

## 2024-07-19 VITALS
DIASTOLIC BLOOD PRESSURE: 64 MMHG | WEIGHT: 162 LBS | BODY MASS INDEX: 23.19 KG/M2 | RESPIRATION RATE: 16 BRPM | OXYGEN SATURATION: 98 % | SYSTOLIC BLOOD PRESSURE: 120 MMHG | TEMPERATURE: 98.4 F | HEIGHT: 70 IN | HEART RATE: 56 BPM

## 2024-07-19 DIAGNOSIS — J02.9 PHARYNGITIS, UNSPECIFIED ETIOLOGY: Primary | ICD-10-CM

## 2024-07-19 LAB — S PYO DNA SPEC NAA+PROBE: NOT DETECTED

## 2024-07-19 PROCEDURE — 3078F DIAST BP <80 MM HG: CPT

## 2024-07-19 PROCEDURE — 3074F SYST BP LT 130 MM HG: CPT

## 2024-07-19 PROCEDURE — 99213 OFFICE O/P EST LOW 20 MIN: CPT

## 2024-07-19 PROCEDURE — 87651 STREP A DNA AMP PROBE: CPT

## 2024-07-19 RX ORDER — METOPROLOL SUCCINATE 25 MG/1
TABLET, EXTENDED RELEASE ORAL
COMMUNITY

## 2024-07-19 ASSESSMENT — ENCOUNTER SYMPTOMS
SHORTNESS OF BREATH: 0
HEADACHES: 0
SORE THROAT: 1
PALPITATIONS: 0
CHILLS: 0
EYE DISCHARGE: 0
EYE PAIN: 0
WHEEZING: 0
MYALGIAS: 0
EYE REDNESS: 0
ABDOMINAL PAIN: 0
STRIDOR: 0
DIZZINESS: 0
FEVER: 0
SPUTUM PRODUCTION: 0
DIARRHEA: 0
NAUSEA: 0
COUGH: 0
VOMITING: 0

## 2024-07-19 ASSESSMENT — FIBROSIS 4 INDEX: FIB4 SCORE: 1.49

## 2024-09-06 DIAGNOSIS — I48.0 PAROXYSMAL ATRIAL FIBRILLATION (HCC): ICD-10-CM

## 2024-09-06 RX ORDER — APIXABAN 5 MG/1
5 TABLET, FILM COATED ORAL 2 TIMES DAILY
Qty: 180 TABLET | Refills: 0 | Status: SHIPPED | OUTPATIENT
Start: 2024-09-06

## 2024-11-20 DIAGNOSIS — I48.0 PAROXYSMAL ATRIAL FIBRILLATION (HCC): ICD-10-CM

## 2024-11-20 RX ORDER — APIXABAN 5 MG/1
5 TABLET, FILM COATED ORAL 2 TIMES DAILY
Qty: 180 TABLET | Refills: 3 | OUTPATIENT
Start: 2024-11-20

## 2025-01-27 NOTE — ED PROVIDER NOTES
"ED Provider Note    CHIEF COMPLAINT  Chief Complaint   Patient presents with   • Abdominal Pain     67 y/o male presents with a chief complaint of abdominal pain x2 days. Per patient the pain started as periumbilical pain, and spread across his abdomen, and is now having pain localized to the RLQ. Per patient he has also been having intermittent nausea. Patient stated that he has also had a fever of 102, which he treated with tylenol yesterday. 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  He says that started initially 2 days ago with some periumbilical tenderness discomfort which initially was associated with development of 102 fever generalized feelings of feeling unwell.  Earlier yesterday evening he had localization of this pain down into the right lower quadrant that was moderate to severe in intensity.  He has had some abdominal \"band\" tenderness across the lower portion of the abdomen but denies any dysuria, hematuria or bowel changes.  He does have pain with manipulation of the right lower extremity and has had guarding with his own palpation of the right lower quadrant.    Last meal was at 7 PM yesterday evening, no prior history of intra-abdominal surgeries.    PPE Note: I personally donned full PPE for all patient encounters during this visit, including being clean-shaven with an N95 respirator mask, gloves, and goggles.     REVIEW OF SYSTEMS  See HPI for further details. All other systems are negative.     PAST MEDICAL HISTORY   has a past medical history of Cardiac arrhythmia, Chickenpox, DVT (deep venous thrombosis) ([facility]), Frequent urination, Heartburn, Hypertension, Influenza, Palpitations, Paroxysmal atrial fibrillation (HCC) (2/1/2017), Ringing in ears, " Federica quinonez/[facility]  [phone]    Patient was in the hospital and discharged on Friday. They need a verbal from an MA to continue with the home health care.   "Tonsillitis, and Wears glasses.    SOCIAL HISTORY  Social History     Tobacco Use   • Smoking status: Never Smoker   • Smokeless tobacco: Never Used   Vaping Use   • Vaping Use: Never used   Substance and Sexual Activity   • Alcohol use: No   • Drug use: No   • Sexual activity: Not on file       SURGICAL HISTORY   has a past surgical history that includes repair, knee, acl; tonsillectomy; other; wound exploration ortho (8/13/2011); irrigation & debridement ortho (8/13/2011); and arthroscopy, knee.    CURRENT MEDICATIONS  Home Medications     Reviewed by Merary Marino R.N. (Registered Nurse) on 07/28/22 at 0340  Med List Status: Not Addressed   Medication Last Dose Status   flecainide (TAMBOCOR) 150 MG Tab 7/27/2022 Active   losartan (COZAAR) 100 MG Tab 7/27/2022 Active   metoprolol (LOPRESSOR) 25 MG Tab 7/27/2022 Active   rosuvastatin (CRESTOR) 10 MG Tab 7/27/2022 Active              ALLERGIES  Allergies   Allergen Reactions   • Amoxicillin    • Ampicillin    • Augmentin Shortness of Breath     PHYSICAL EXAM  VITAL SIGNS: /65   Pulse 72   Temp 37.1 °C (98.8 °F) (Temporal)   Resp 16   Ht 1.778 m (5' 10\")   Wt 76.9 kg (169 lb 8.5 oz)   SpO2 97%   BMI 24.33 kg/m²   Pulse ox interpretation: I interpret this pulse ox as normal.  Genl: M sitting in rney uncomfortably, speaking clearly, appears in no acute distress   Head: NC/AT   ENT: Mucous membranes moist, posterior pharynx clear, uvula midline, nares patent bilaterally   Eyes: Normal sclera, pupils equal round reactive to light  Neck: Supple, FROM, no LAD appreciated   Pulmonary: Lungs are clear to auscultation bilaterally  Chest: No TTP  CV:  RRR, no murmur appreciated, pulses 2+ in both upper and lower extremities,  Abdomen: soft, nonspecific epigastric discomfort, mild periumbilical tenderness and diffuse right lower quadrant tenderness.  Positive guarding and rebound.  Positive pain with straight leg raise. No masses palpated, no HSM  : no CVA " tenderness  Musculoskeletal: Pain free ROM of the neck. Moving upper and lower extremities and spontaneous in coordinated fashion  Neuro: A&Ox4 (person, place, time, situation), speech fluent, gait steady, no focal deficits appreciated  Skin: No rash or lesions.  No pallor or jaundice.  No cyanosis.  Warm and dry.     DIAGNOSTIC STUDIES / PROCEDURES    LABS  Labs Reviewed   CBC WITH DIFFERENTIAL - Abnormal; Notable for the following components:       Result Value    WBC 18.5 (*)     Neutrophils-Polys 74.60 (*)     Lymphocytes 18.00 (*)     Neutrophils (Absolute) 13.76 (*)     Monos (Absolute) 1.20 (*)     All other components within normal limits   COMP METABOLIC PANEL - Abnormal; Notable for the following components:    Glucose 116 (*)     Total Bilirubin 1.6 (*)     Globulin 3.7 (*)     All other components within normal limits   LIPASE   LACTIC ACID   ESTIMATED GFR     RADIOLOGY  CT-ABDOMEN-PELVIS WITH   Final Result         1.  Changes compatible with tip appendicitis   2.  Fluid-filled distended small bowel loops in right lower quadrant, likely ileus related to appendicitis.   3.  Hepatomegaly   4.  Enlarged prostate, workup and evaluation for causes of prostate enlargement recommended as clinically appropriate.      These findings were discussed with the patient's clinician, Dr. Mcpherson, on 7/28/2022 4:54 AM.        COURSE & MEDICAL DECISION MAKING  Pertinent Labs & Imaging studies reviewed. (See chart for details)    DDX:  Appendicitis  Foodborne illness  Colitis  Diverticulitis  Aortic aneurysm unlikely  Mesenteric ischemia unlikely  Pancreatitis  Cholecystitis  Cholelithiasis  Nephrolithiasis    MDM    Initial evaluation at 0347:  Patient is seen and evaluated for symptoms as described above.  The patient is alert, oriented, is not febrile or with tachycardia or hemodynamic instability.  He describes escalating amounts of periumbilical tenderness with localization to the right lower quadrant.  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  While it is reassuring that he does not have gross abdominal peritonitis he does have very merle right lower quadrant tenderness easily reproduced with both touch, rebound and lower leg manipulation.  IV access established, he is made n.p.o. in anticipation of possible surgical intervention and CT scan and work-up for the above differentials initiated.    As needed pain medications and antiemetics are also ordered, fluids started anticipation of surgical intervention.    Lab work shows leukocytosis, leftward shift with additional monocyte predominance.  Lactate is thankfully not grossly elevated.  No evidence of lipase elevation or gross LFT elevations.  I would doubt that this is an obstructive hepatobiliary process.  Glucose is on the higher end of normal with reassuring anion gap and bicarb and there is no other gross electrolyte abnormalities.    CT scan shows a acute tip appendicitis and incidental bilateral renal cyst.  This was discussed with the radiologist and then general surgery was paged.  Discussed the findings with the patient who is aware of the need of surgical intervention.  Patient's last meal was 7 PM yesterday, he received initial dose of Rocephin and Flagyl in addition to as needed pain medications.  I discussed the case with Dr. Janneth Castellanos of general surgery who would like the patient put on holding orders, continued on n.p.o. status and they are planned surgical interventions later this morning.    Admitted in guarded condition    HYDRATION: Based on the patient's presentation of Other npo status for possible surgical intervention the patient was given IV fluids. IV Hydration was used because oral hydration was not adequate alone. Upon recheck following hydration, the patient was improvd.    FINAL IMPRESSION  Visit Diagnoses     ICD-10-CM   1. Nausea  R11.0   2. Pain,  abdominal, RLQ  R10.31   3. Fever, unspecified fever cause  R50.9   4. Leukocytosis, unspecified type  D72.829   5. Acute appendicitis with localized peritonitis, without perforation, abscess, or gangrene  K35.30     Electronically signed by: Jeff Mcpherson M.D., 7/28/2022 3:47 AM

## (undated) DEVICE — ELECTRODE 5MM LHK LAPSCP STERILE DISP- MEGADYNE  (5/CA)

## (undated) DEVICE — SENSOR OXIMETER ADULT SPO2 RD SET (20EA/BX)

## (undated) DEVICE — DERMABOND ADVANCED - (12EA/BX)

## (undated) DEVICE — TOWEL STOP TIMEOUT SAFETY FLAG (40EA/CA)

## (undated) DEVICE — CANNULA W/SEAL 5X100 Z-THRE - ADED KII (12/BX)

## (undated) DEVICE — SUCTION INSTRUMENT YANKAUER BULBOUS TIP W/O VENT (50EA/CA)

## (undated) DEVICE — GLOVE 6.5 LF PF PROTEXIS (50PR/BX)

## (undated) DEVICE — TRAY CATHETER FOLEY URINE METER W/STATLOCK 350ML (10EA/CA)

## (undated) DEVICE — DRAPESURG STERI-DRAPE LONG - (10/BX 4BX/CA)

## (undated) DEVICE — SPONGE GAUZESTER. 2X2 4-PL - (2/PK 50PK/BX 30BX/CS)

## (undated) DEVICE — TUBING LAPAROSCOPIC PLUME DEVICE (10EA/CA)

## (undated) DEVICE — STAPLER 45MM ARTICULATING - ENDO (3EA/BX)

## (undated) DEVICE — STAPLE 45MM VASCULAR WHITE 2.5MM (12EA/BX)

## (undated) DEVICE — SUTURE GENERAL

## (undated) DEVICE — DRAPE STRLE REG TOWEL 18X24 - (10/BX 4BX/CA)"

## (undated) DEVICE — TROCAR LAPSCP 100MM 12MM NTHRD - (6/BX)

## (undated) DEVICE — CLIP MED LG INTNL HRZN TI ESCP - (20/BX)

## (undated) DEVICE — BAG RETRIEVAL 10ML (10EA/BX)

## (undated) DEVICE — CHLORAPREP 26 ML APPLICATOR - ORANGE TINT(25/CA)

## (undated) DEVICE — STYLETTE 6FR INFANT STERILE SINGEL ALUMINUM SUNSLIP SEALED IN PVC SHEATH (20EA/BX)

## (undated) DEVICE — SUTURE 4-0 MONOCRYL PLUS PS-2 - 27 INCH (36/BX)

## (undated) DEVICE — Device

## (undated) DEVICE — GLOVE BIOGEL PI INDICATOR SZ 7.0 SURGICAL PF LF - (50/BX 4BX/CA)

## (undated) DEVICE — GUIDE TRACHE TUBE INTUBATING STYLET 5.0-10.0MM 14FR (20EA/PK)

## (undated) DEVICE — TROCAR 5X100 BLADED Z-THREAD - KII (6/BX)

## (undated) DEVICE — SUTURE 0 VICRYL PLUS UR-6 - 27 INCH (36/BX)

## (undated) DEVICE — SEALER VESSEL HARMONIC ACE PLUS WITH ADVANCED HEMOSTASIS 36CM (1/EA)

## (undated) DEVICE — TUBE CONNECTING SUCTION - CLEAR PLASTIC STERILE 72 IN (50EA/CA)

## (undated) DEVICE — DRESSING TRANSPARENT FILM TEGADERM 2.375 X 2.75"  (100EA/BX)"

## (undated) DEVICE — SYSTEM CLEARIFY VISUALIZATION (10EA/PK)

## (undated) DEVICE — SET SUCTION/IRRIGATION WITH DISPOSABLE TIP (6/CA )PART #0250-070-520 IS A SUB

## (undated) DEVICE — CANISTER SUCTION RIGID RED 1500CC (40EA/CA)

## (undated) DEVICE — STAPLE 45MM BLUE 4.5MM (12EA/BX)

## (undated) DEVICE — WATER IRRIGATION STERILE 1000ML (12EA/CA)

## (undated) DEVICE — HUMID-VENT HEAT AND MOISTURE EXCHANGE- (50/BX)